# Patient Record
Sex: FEMALE | Race: ASIAN | NOT HISPANIC OR LATINO
[De-identification: names, ages, dates, MRNs, and addresses within clinical notes are randomized per-mention and may not be internally consistent; named-entity substitution may affect disease eponyms.]

---

## 2017-05-11 ENCOUNTER — TRANSCRIPTION ENCOUNTER (OUTPATIENT)
Age: 62
End: 2017-05-11

## 2017-06-27 ENCOUNTER — APPOINTMENT (OUTPATIENT)
Dept: ORTHOPEDIC SURGERY | Facility: CLINIC | Age: 62
End: 2017-06-27

## 2017-06-27 VITALS
BODY MASS INDEX: 24.19 KG/M2 | HEART RATE: 65 BPM | HEIGHT: 59 IN | DIASTOLIC BLOOD PRESSURE: 80 MMHG | WEIGHT: 120 LBS | SYSTOLIC BLOOD PRESSURE: 145 MMHG

## 2017-06-29 RX ORDER — LIDOCAINE HYDROCHLORIDE 10 MG/ML
1 INJECTION, SOLUTION INFILTRATION; PERINEURAL
Refills: 0 | Status: COMPLETED | OUTPATIENT
Start: 2017-06-29

## 2017-06-29 RX ORDER — METHYLPRED ACET/NACL,ISO-OS/PF 40 MG/ML
40 VIAL (ML) INJECTION
Qty: 1 | Refills: 0 | Status: COMPLETED | OUTPATIENT
Start: 2017-06-29

## 2017-06-29 RX ADMIN — METHYLPREDNISOLONE ACETATE MG/ML: 40 INJECTION, SUSPENSION INTRA-ARTICULAR; INTRALESIONAL; INTRAMUSCULAR; SOFT TISSUE at 00:00

## 2017-06-29 RX ADMIN — LIDOCAINE HYDROCHLORIDE %: 10 INJECTION, SOLUTION INFILTRATION; PERINEURAL at 00:00

## 2017-07-10 ENCOUNTER — TRANSCRIPTION ENCOUNTER (OUTPATIENT)
Age: 62
End: 2017-07-10

## 2017-07-11 ENCOUNTER — RESULT REVIEW (OUTPATIENT)
Age: 62
End: 2017-07-11

## 2017-07-26 ENCOUNTER — APPOINTMENT (OUTPATIENT)
Dept: SURGERY | Facility: CLINIC | Age: 62
End: 2017-07-26

## 2017-07-26 VITALS
HEART RATE: 72 BPM | BODY MASS INDEX: 23.16 KG/M2 | SYSTOLIC BLOOD PRESSURE: 123 MMHG | WEIGHT: 118 LBS | HEIGHT: 60 IN | OXYGEN SATURATION: 99 % | RESPIRATION RATE: 15 BRPM | DIASTOLIC BLOOD PRESSURE: 88 MMHG | TEMPERATURE: 98.1 F

## 2017-07-26 DIAGNOSIS — K62.9 DISEASE OF ANUS AND RECTUM, UNSPECIFIED: ICD-10-CM

## 2017-07-26 DIAGNOSIS — K64.5 PERIANAL VENOUS THROMBOSIS: ICD-10-CM

## 2017-07-26 RX ORDER — FLUTICASONE PROPIONATE 50 UG/1
50 SPRAY, METERED NASAL
Qty: 16 | Refills: 0 | Status: DISCONTINUED | COMMUNITY
Start: 2017-06-13

## 2017-07-26 RX ORDER — RAMIPRIL 10 MG
TABLET ORAL
Refills: 0 | Status: DISCONTINUED | COMMUNITY

## 2017-07-26 RX ORDER — RAMIPRIL 5 MG/1
5 CAPSULE ORAL
Refills: 0 | Status: ACTIVE | COMMUNITY

## 2017-07-27 ENCOUNTER — APPOINTMENT (OUTPATIENT)
Dept: ORTHOPEDIC SURGERY | Facility: CLINIC | Age: 62
End: 2017-07-27
Payer: COMMERCIAL

## 2017-07-27 VITALS
HEART RATE: 68 BPM | HEIGHT: 60 IN | SYSTOLIC BLOOD PRESSURE: 117 MMHG | DIASTOLIC BLOOD PRESSURE: 78 MMHG | WEIGHT: 118 LBS | BODY MASS INDEX: 23.16 KG/M2

## 2017-07-27 DIAGNOSIS — Z86.79 PERSONAL HISTORY OF OTHER DISEASES OF THE CIRCULATORY SYSTEM: ICD-10-CM

## 2017-07-27 DIAGNOSIS — Z87.39 PERSONAL HISTORY OF OTHER DISEASES OF THE MUSCULOSKELETAL SYSTEM AND CONNECTIVE TISSUE: ICD-10-CM

## 2017-07-27 PROCEDURE — 99214 OFFICE O/P EST MOD 30 MIN: CPT

## 2017-07-27 RX ORDER — HYDROCORTISONE ACETATE AND PRAMOXINE HYDROCHLORIDE 25; 10 MG/G; MG/G
2.5-1 CREAM TOPICAL
Qty: 30 | Refills: 1 | Status: ACTIVE | COMMUNITY
Start: 2017-07-27 | End: 1900-01-01

## 2017-07-27 RX ORDER — DICLOFENAC SODIUM 100 MG/1
100 TABLET, FILM COATED, EXTENDED RELEASE ORAL
Qty: 30 | Refills: 0 | Status: ACTIVE | COMMUNITY
Start: 2017-07-27 | End: 1900-01-01

## 2017-07-27 RX ORDER — HYDROCORTISONE ACETATE, PRAMOXINE HCL 2.5; 1 G/100G; G/100G
2.5-1 CREAM TOPICAL
Qty: 30 | Refills: 0 | Status: DISCONTINUED | COMMUNITY
Start: 2017-07-26

## 2017-07-27 RX ORDER — PRAMOXINE HYDROCHLORIDE AND HYDROCORTISONE ACETATE 10; 25 MG/G; MG/G
2.5-1 CREAM TOPICAL
Qty: 30 | Refills: 2 | Status: DISCONTINUED | COMMUNITY
Start: 2017-07-26 | End: 2017-07-27

## 2017-07-30 ENCOUNTER — FORM ENCOUNTER (OUTPATIENT)
Age: 62
End: 2017-07-30

## 2017-07-31 ENCOUNTER — OUTPATIENT (OUTPATIENT)
Dept: OUTPATIENT SERVICES | Facility: HOSPITAL | Age: 62
LOS: 1 days | End: 2017-07-31
Payer: COMMERCIAL

## 2017-07-31 ENCOUNTER — APPOINTMENT (OUTPATIENT)
Dept: MRI IMAGING | Facility: CLINIC | Age: 62
End: 2017-07-31
Payer: COMMERCIAL

## 2017-07-31 DIAGNOSIS — M17.0 BILATERAL PRIMARY OSTEOARTHRITIS OF KNEE: ICD-10-CM

## 2017-07-31 PROCEDURE — 73721 MRI JNT OF LWR EXTRE W/O DYE: CPT

## 2017-07-31 PROCEDURE — 73721 MRI JNT OF LWR EXTRE W/O DYE: CPT | Mod: 26,RT

## 2017-08-31 ENCOUNTER — APPOINTMENT (OUTPATIENT)
Dept: ORTHOPEDIC SURGERY | Facility: CLINIC | Age: 62
End: 2017-08-31
Payer: COMMERCIAL

## 2017-08-31 PROCEDURE — 99214 OFFICE O/P EST MOD 30 MIN: CPT

## 2017-08-31 RX ORDER — HYALURONATE SODIUM 20 MG/2 ML
20 SYRINGE (ML) INTRAARTICULAR
Qty: 2 | Refills: 0 | Status: ACTIVE | OUTPATIENT
Start: 2017-08-31

## 2017-09-01 ENCOUNTER — OTHER (OUTPATIENT)
Age: 62
End: 2017-09-01

## 2017-09-14 ENCOUNTER — OTHER (OUTPATIENT)
Age: 62
End: 2017-09-14

## 2017-09-28 ENCOUNTER — APPOINTMENT (OUTPATIENT)
Dept: ORTHOPEDIC SURGERY | Facility: CLINIC | Age: 62
End: 2017-09-28
Payer: COMMERCIAL

## 2017-09-28 VITALS
SYSTOLIC BLOOD PRESSURE: 133 MMHG | HEART RATE: 69 BPM | BODY MASS INDEX: 23.16 KG/M2 | WEIGHT: 118 LBS | HEIGHT: 60 IN | DIASTOLIC BLOOD PRESSURE: 86 MMHG

## 2017-09-28 PROCEDURE — 20610 DRAIN/INJ JOINT/BURSA W/O US: CPT | Mod: 50

## 2017-09-28 PROCEDURE — 99213 OFFICE O/P EST LOW 20 MIN: CPT | Mod: 25

## 2017-10-05 ENCOUNTER — APPOINTMENT (OUTPATIENT)
Dept: ORTHOPEDIC SURGERY | Facility: CLINIC | Age: 62
End: 2017-10-05
Payer: COMMERCIAL

## 2017-10-05 VITALS
WEIGHT: 120 LBS | BODY MASS INDEX: 23.56 KG/M2 | HEART RATE: 67 BPM | SYSTOLIC BLOOD PRESSURE: 145 MMHG | DIASTOLIC BLOOD PRESSURE: 90 MMHG | HEIGHT: 60 IN

## 2017-10-05 PROCEDURE — 20610 DRAIN/INJ JOINT/BURSA W/O US: CPT | Mod: 50

## 2017-10-12 ENCOUNTER — APPOINTMENT (OUTPATIENT)
Dept: ORTHOPEDIC SURGERY | Facility: CLINIC | Age: 62
End: 2017-10-12
Payer: COMMERCIAL

## 2017-10-12 VITALS — SYSTOLIC BLOOD PRESSURE: 130 MMHG | DIASTOLIC BLOOD PRESSURE: 82 MMHG | HEART RATE: 75 BPM

## 2017-10-12 PROCEDURE — 20610 DRAIN/INJ JOINT/BURSA W/O US: CPT | Mod: 50

## 2017-10-31 ENCOUNTER — APPOINTMENT (OUTPATIENT)
Dept: ORTHOPEDIC SURGERY | Facility: CLINIC | Age: 62
End: 2017-10-31

## 2017-11-09 ENCOUNTER — APPOINTMENT (OUTPATIENT)
Dept: ORTHOPEDIC SURGERY | Facility: CLINIC | Age: 62
End: 2017-11-09

## 2017-12-28 ENCOUNTER — APPOINTMENT (OUTPATIENT)
Dept: ORTHOPEDIC SURGERY | Facility: CLINIC | Age: 62
End: 2017-12-28

## 2018-07-13 ENCOUNTER — APPOINTMENT (OUTPATIENT)
Dept: OBGYN | Facility: CLINIC | Age: 63
End: 2018-07-13

## 2018-10-10 ENCOUNTER — OUTPATIENT (OUTPATIENT)
Dept: OUTPATIENT SERVICES | Facility: HOSPITAL | Age: 63
LOS: 1 days | End: 2018-10-10
Payer: COMMERCIAL

## 2018-10-10 ENCOUNTER — APPOINTMENT (OUTPATIENT)
Dept: MAMMOGRAPHY | Facility: IMAGING CENTER | Age: 63
End: 2018-10-10
Payer: COMMERCIAL

## 2018-10-10 DIAGNOSIS — Z00.8 ENCOUNTER FOR OTHER GENERAL EXAMINATION: ICD-10-CM

## 2018-10-10 PROCEDURE — 77067 SCR MAMMO BI INCL CAD: CPT

## 2018-10-10 PROCEDURE — 77063 BREAST TOMOSYNTHESIS BI: CPT

## 2018-10-10 PROCEDURE — 77067 SCR MAMMO BI INCL CAD: CPT | Mod: 26

## 2018-10-10 PROCEDURE — 77063 BREAST TOMOSYNTHESIS BI: CPT | Mod: 26

## 2018-11-09 ENCOUNTER — TRANSCRIPTION ENCOUNTER (OUTPATIENT)
Age: 63
End: 2018-11-09

## 2018-12-04 ENCOUNTER — RESULT REVIEW (OUTPATIENT)
Age: 63
End: 2018-12-04

## 2018-12-27 ENCOUNTER — OUTPATIENT (OUTPATIENT)
Dept: OUTPATIENT SERVICES | Facility: HOSPITAL | Age: 63
LOS: 1 days | End: 2018-12-27
Payer: COMMERCIAL

## 2018-12-27 ENCOUNTER — APPOINTMENT (OUTPATIENT)
Dept: CV DIAGNOSITCS | Facility: HOSPITAL | Age: 63
End: 2018-12-27

## 2018-12-27 DIAGNOSIS — I25.10 ATHEROSCLEROTIC HEART DISEASE OF NATIVE CORONARY ARTERY WITHOUT ANGINA PECTORIS: ICD-10-CM

## 2018-12-27 PROCEDURE — 93306 TTE W/DOPPLER COMPLETE: CPT | Mod: 26

## 2018-12-27 PROCEDURE — 93306 TTE W/DOPPLER COMPLETE: CPT

## 2019-01-17 ENCOUNTER — OUTPATIENT (OUTPATIENT)
Dept: OUTPATIENT SERVICES | Facility: HOSPITAL | Age: 64
LOS: 1 days | End: 2019-01-17
Payer: COMMERCIAL

## 2019-01-17 ENCOUNTER — APPOINTMENT (OUTPATIENT)
Dept: ULTRASOUND IMAGING | Facility: HOSPITAL | Age: 64
End: 2019-01-17
Payer: COMMERCIAL

## 2019-01-17 DIAGNOSIS — Z00.00 ENCOUNTER FOR GENERAL ADULT MEDICAL EXAMINATION WITHOUT ABNORMAL FINDINGS: ICD-10-CM

## 2019-01-17 DIAGNOSIS — Z00.01 ENCOUNTER FOR GENERAL ADULT MEDICAL EXAMINATION WITH ABNORMAL FINDINGS: ICD-10-CM

## 2019-01-17 PROCEDURE — 93880 EXTRACRANIAL BILAT STUDY: CPT | Mod: 26

## 2019-01-17 PROCEDURE — 93880 EXTRACRANIAL BILAT STUDY: CPT

## 2019-06-13 ENCOUNTER — APPOINTMENT (OUTPATIENT)
Dept: ORTHOPEDIC SURGERY | Facility: CLINIC | Age: 64
End: 2019-06-13
Payer: COMMERCIAL

## 2019-06-13 VITALS — SYSTOLIC BLOOD PRESSURE: 121 MMHG | DIASTOLIC BLOOD PRESSURE: 88 MMHG | HEART RATE: 74 BPM

## 2019-06-13 PROCEDURE — 99214 OFFICE O/P EST MOD 30 MIN: CPT

## 2019-06-13 PROCEDURE — 73562 X-RAY EXAM OF KNEE 3: CPT | Mod: 50

## 2019-06-13 RX ORDER — HYALURONATE SODIUM 20 MG/2 ML
20 SYRINGE (ML) INTRAARTICULAR
Qty: 2 | Refills: 0 | Status: ACTIVE | OUTPATIENT
Start: 2019-06-13

## 2019-06-16 NOTE — HISTORY OF PRESENT ILLNESS
[Pain] : pain [10] : currently ~his/her~ pain is 10 out of 10 [Walking] : walking [Standing] : standing [Intermit.] : ~He/She~ states the symptoms seem to be intermittent [Sitting] : sitting [Rest] : relieved by rest [All Hx] : past medical, family, and social [Knee Flexion] : worsened with knee flexion [Knee Extension] : worsened with knee extension [All] : medication and allergy [All Other ROS Normal] : All other review of systems are negative except as noted [Joint Pain] : joint pain [Muscle Aches] : muscle aches [Joint Stiffness] : joint stiffness [FreeTextEntry2] : Patient states that she has had pain in her knees for many years but for the past two months she has had sever pain in both knees right is much worse than the left. She was last seen last month and had an MRI of the right knee. She has been seeing some improvement since doing some pool exercises, but she feels that she is still limited. She is unable to climb stairs. \par She initially felt pain tow months ago, after dancing.  [FreeTextEntry1] : Bilateral knee pain [de-identified] : Ms. SINGH SYLVESTER is a 64 year old female  presenting with bilateral knee pain here for a follow up. She states her right knee is more painful  than the left. She localizes the pain to the medial anterior aspect of the bilateral knees. She admits to occasional swelling, clicking and grinding of the knee. She has occasional buckling as well. she describes the pain as sharp and intermittent, and states the pain is present when walking and climbing stairs. \par She was last seen in 2017, and received Euflexxa injections with relief for many months. She has had physical therapy as well in the past with minimal relief. She has been taking medications for pain as needed with no relief.\par She is here for reevaluation. She would like to consider injections again.  [Worsening] : worsening [5] : a current pain level of 5/10

## 2019-06-16 NOTE — DISCUSSION/SUMMARY
[de-identified] : Right knee medial meniscus tear with bilateral knee advanced degenerative joint disease \par The natural history and treatment of degenerative arthritis was discussed with the patient at length today. The spectrum of treatment including nonoperative modalities to surgical intervention was elucidated. Noninvasive and nonoperative treatment modalities include weight reduction, activity modification with low impact exercise,  as needed use of acetaminophen or anti-inflammatory medications if tolerated, glucosamine/chondroitin supplements, and physical therapy. Further treatments can include corticosteroid injection and the use of viscosupplementation with hyaluronic acid injections. Definitive surgical treatment can certainly include total joint arthroplasty also. The risks and benefits of each treatment options was discussed and all questions were answered.\par The patient was informed of the findings and recommended conservative management in the form of a home exercise program, activity modifications, stationary bicycling, swimming and weight loss program. A trial of Glucosamine and Chondroiten Sulphate was recommended.\par I have recommended Euflexxa injections to the bilateral knee and the patient agreed to proceed, and the risks, benefits and side effects were discussed.Follow-up appointment was recommended once the injection is received in the office to begin the series\par She will continue home exercises.

## 2019-06-16 NOTE — PHYSICAL EXAM
[Antalgic] : antalgic [LE] : Sensory: Intact in bilateral lower extremities [Knee] : patellar 2+ and symmetric bilaterally [Ankle] : ankle 2+ and symmetric bilaterally [DP] : dorsalis pedis 2+ and symmetric bilaterally [PT] : posterior tibial 2+ and symmetric bilaterally [Poor Appearance] : well-appearing [Acute Distress] : not in acute distress [Obese] : not obese [FreeTextEntry2] : On general examination the patient is adequately groomed and nourished. The vital parameters are as recorded. \par There is no lymphedema or diffuse swelling, no varicose veins, no skin warmth/erythema/scars/swelling, no ulcers and no palpable lymph nodes or masses in both lower extremities. Bilateral pedal pulses are well palpable.\par Upper Extremity:\par Both right and left upper extremities are unremarkable in terms of skin rash, lesions, pigmentation, redness, tenderness, swelling, joint instability, abnormal deformity or crepitus. The overall range of motion, sensation, motor tone and strength testing are normal.\par \par Bilateral Knee Exam: \par The gait is bilateral stiff knee antalgic.\par Knee alignment:         slightly varus\par Both knees demonstrate no scars and the skin has no warmth, erythema, swelling or tenderness. \par Both knees have a range of motion of\par Extension:                    Right 0 degrees     Left 0 degrees\par Flexion:                                   Right 110 degrees      Left 120 degrees\par There is bilateral knee medial joint line tenderness. There is bilateral knee mild effusion. \par Chen's test is positive. Cary test is positive.\par Lachman's test, Anterior/Posterior Drawer test and Pivot Shift Tests are negative. \par There is bilateral knee grade 1 MCL mediolateral laxity and no anteroposterior instability. \par Patella compression test is positive and patellofemoral tracking is normal with no lateral subluxation, apprehension or instability. \par Right knee quadriceps and hamstrings power is 4+.\par Left knee quadriceps and hamstrings power is 4+.\par \par Hip Exam:\par The gait and station is normal\par The patient has equal leg lengths and no pelvic tilt. Edison/Gabby test is 7 inches on the right and 7 inches on the left. Active SLR is 60 degrees on the right and 60 degrees on the left. Both hips demonstrate no scars and the skin has no signs of inflammation or tenderness. \par Both Hips have a normal range of motion of flexion to 100 degrees, abduction 40 degrees, adduction 20 degrees, external rotation 40 degrees, internal rotation 20 degrees with symmetrical motion in flexion and extension. There is no flexion contracture, deformity or instability. Labral impingement tests are negative.\par Both hips flexor, abductor and extensor power is normal.  [de-identified] : EXAM:    MRI  KNEE  WO  CON  RT\par \par \par PROCEDURE  DATE:    07/31/2017\par \par \par \par INTERPRETATION:\par MRI  OF  THE  RIGHT  KNEE\par \par CLINICAL  INDICATION:  Right  knee  pain.  History  of  osteoarthritis.\par TECHNIQUE:  Multiplanar,  Multisequence  MRI  was  obtained  of  the  right  knee.\par COMPARISON:  No  prior  studies  available  for  comparison.\par \par FINDINGS:\par \par CRUCIATE  AND  COLLATERAL  LIGAMENTS:  The  ACL  and  PCL  are  intact.  The  medial\par collateral  ligament  and  lateral  collateral  ligament  are  intact.\par MEDIAL  COMPARTMENT:  There  is  a  radial  tear  at  the  posterior  root  attachment\par of  the  medial  meniscus  with  partial  extrusion  of  the  body  of  the  meniscus\par beyond  the  medial  joint  line.  There  is  full-thickness  cartilage  loss  at  the\par central  weightbearing  medial  femoral  condyle  and  opposing  tibial  plateau\par with  mild  subchondral  cystic  changes  at  the  medial  femoral  condyle.  There\par are  areas  of  partial  thickness  cartilage  loss  at  the  posterior  medial\par femoral  condyle.  Marginal  osteophytosis  is  noted.\par LATERAL  COMPARTMENT:  The  lateral  meniscus  is  intact.  There  is  a  deep\par chondral  fissure  at  the  posterior  lateral  tibial  plateau.  There  is  cartilage\par thinning  at  the  posterior  lateral  femoral  condyle.  Marginal  osteophytosis  is\par noted.\par PATELLOFEMORAL  COMPARTMENT:  There  is  full-thickness  cartilage  loss  at  the\par lateral  patellar  facet  and  patellar  eminence  with  subchondral  cystic\par changes.  There  is  moderate  grade  chondral  wear  at  the  lateral  aspects  of  the\par lateral  trochlea.  Marginal  osteophytosis  is  noted.\par EXTENSOR  MECHANISM:  The  extensor  mechanism  is  intact.\par SYNOVIUM/  JOINT  FLUID:  There  is  a  small  knee  joint  fluid.  There  is  a  loose\par body  in  the  suprapatellar  recess  measuring  1.4  x  1.1  x  0.7  cm.\par BONE  MARROW:  No  acute  fracture  or  avascular  necrosis.  Subchondral  cystic\par changes  are  noted  at  the  lateral  patella,  lateral  trochlea  and  the  central\par weightbearing  medial  femoral  condyle.\par MUSCLES:  No  muscle  atrophy  or  edema.\par NEUROVASCULAR  STRUCTURES:  Varicose  veins  are  noted  at  the  posterior  knee.\par SUBCUTANEOUS  SOFT  TISSUES:  No  subcutaneous  soft  tissue  edema.\par \par IMPRESSION:\par \par Tricompartmental  degenerative  changes  most  pronounced  at  the  patellofemoral\par and  medial  compartments  as  described  above.  Associated  radial  tear  at  the\par posterior  horn  root  attachment  of  the  medial  meniscus  with  meniscal\par extrusion.\par \par Small  knee  joint  effusion  and  a  loose  body  in  the  suprapatellar  recess.\par \par \par \par The following radiographs were ordered and read by me during this patients visit. I reviewed each radiograph in detail with the patient and discussed the findings as highlighted below. \par AP, lateral and skyline views of the bilateral knees confirm advanced degenerative joint disease with medial joint space narrowing and osteophyte formation, right more than left.\par \par \par

## 2019-06-16 NOTE — CONSULT LETTER
[Dear  ___] : Dear  [unfilled], [Consult Letter:] : I had the pleasure of evaluating your patient, [unfilled]. [Please see my note below.] : Please see my note below. [Consult Closing:] : Thank you very much for allowing me to participate in the care of this patient.  If you have any questions, please do not hesitate to contact me. [Sincerely,] : Sincerely, [FreeTextEntry2] : PATRICK RINALDI\par  [FreeTextEntry3] : Tim Figueroa MD\par \par ______________________________________________\par [facility]: Hip/Knee Arthroplasty\par [address], [address], [place] [address]\par (t) [phone]\par (f) [phone]\par

## 2019-06-27 ENCOUNTER — APPOINTMENT (OUTPATIENT)
Dept: ORTHOPEDIC SURGERY | Facility: CLINIC | Age: 64
End: 2019-06-27
Payer: COMMERCIAL

## 2019-06-27 PROCEDURE — 20610 DRAIN/INJ JOINT/BURSA W/O US: CPT | Mod: 50

## 2019-06-27 NOTE — PROCEDURE
[Bilateral] : of bilateral [Injection] : Injection [Patient] : patient [Osteoarthritis] : Osteoarthritis [Knee Joint] : knee joint [Risk] : risk [Benefits] : benefits [Bleeding] : bleeding [Alternatives] : alternatives [Infection] : infection [Allergic Reaction] : allergic reaction [Verbal Consent Obtained] : verbal consent was obtained prior to the procedure [Betadine] : Betadine [Ethyl Chloride Spray] : ethyl chloride spray was used as a topical anesthetic [Lateral] : lateral [Anterior] : anterior [22] : a 22-gauge [2 mL Euflexxa___(lot #)] : 2mL Euflexxa ~Ulot# [unfilled] [de-identified] : 5/9/2020 Exp Lot [id number]\par The patient received the first set of injections to bilateral knees of Euflexxa and tolerated well. \par The patient has been instructed to ice and elevate to alleviate/reduce swelling. \par follow up appointment recommended next week for the second set of injections to bilateral knees. \par

## 2019-07-11 ENCOUNTER — APPOINTMENT (OUTPATIENT)
Dept: ORTHOPEDIC SURGERY | Facility: CLINIC | Age: 64
End: 2019-07-11
Payer: COMMERCIAL

## 2019-07-11 PROCEDURE — 20600 DRAIN/INJ JOINT/BURSA W/O US: CPT | Mod: 50

## 2019-07-12 NOTE — PROCEDURE
[Injection] : Injection [Bilateral] : of bilateral [Knee Joint] : knee joint [Osteoarthritis] : Osteoarthritis [Patient] : patient [Risk] : risk [Benefits] : benefits [Alternatives] : alternatives [Bleeding] : bleeding [Infection] : infection [Verbal Consent Obtained] : verbal consent was obtained prior to the procedure [Allergic Reaction] : allergic reaction [Betadine] : Betadine [Lateral] : lateral [Ethyl Chloride Spray] : ethyl chloride spray was used as a topical anesthetic [Anterior] : anterior [22] : a 22-gauge [2 mL Euflexxa___(lot #)] : 2mL Euflexxa ~Ulot# [unfilled] [de-identified] : 5/9/2020 Exp Lot [id number]\par The patient received the second set of injections to bilateral knees of Euflexxa and tolerated well. \par The patient has been instructed to ice and elevate to alleviate/reduce swelling. \par follow up appointment recommended next week for the third set of injections to bilateral knees. \par

## 2019-07-12 NOTE — END OF VISIT
[FreeTextEntry3] : I have read the above documentation and agree with the treatment and plan. \par Tim Figueroa MD\par \par ______________________________________________\par [facility]: Hip/Knee Arthroplasty\par [address], [address], [place] [address]\par (t) [phone]\par (f) [phone]\par

## 2019-07-17 ENCOUNTER — EMERGENCY (EMERGENCY)
Facility: HOSPITAL | Age: 64
LOS: 1 days | Discharge: HOME | End: 2019-07-17
Attending: EMERGENCY MEDICINE
Payer: COMMERCIAL

## 2019-07-17 VITALS
OXYGEN SATURATION: 98 % | HEART RATE: 99 BPM | TEMPERATURE: 98 F | WEIGHT: 119.93 LBS | RESPIRATION RATE: 16 BRPM | SYSTOLIC BLOOD PRESSURE: 157 MMHG | DIASTOLIC BLOOD PRESSURE: 98 MMHG | HEIGHT: 60 IN

## 2019-07-17 VITALS
TEMPERATURE: 98 F | HEART RATE: 71 BPM | SYSTOLIC BLOOD PRESSURE: 133 MMHG | DIASTOLIC BLOOD PRESSURE: 89 MMHG | OXYGEN SATURATION: 98 % | RESPIRATION RATE: 17 BRPM

## 2019-07-17 LAB
ALBUMIN SERPL ELPH-MCNC: 4.4 G/DL — SIGNIFICANT CHANGE UP (ref 3.3–5)
ALP SERPL-CCNC: 68 U/L — SIGNIFICANT CHANGE UP (ref 40–120)
ALT FLD-CCNC: 18 U/L — SIGNIFICANT CHANGE UP (ref 10–45)
ANION GAP SERPL CALC-SCNC: 11 MMOL/L — SIGNIFICANT CHANGE UP (ref 5–17)
APPEARANCE UR: CLEAR — SIGNIFICANT CHANGE UP
AST SERPL-CCNC: 19 U/L — SIGNIFICANT CHANGE UP (ref 10–40)
BACTERIA # UR AUTO: ABNORMAL
BILIRUB SERPL-MCNC: 0.4 MG/DL — SIGNIFICANT CHANGE UP (ref 0.2–1.2)
BILIRUB UR-MCNC: NEGATIVE — SIGNIFICANT CHANGE UP
BUN SERPL-MCNC: 18 MG/DL — SIGNIFICANT CHANGE UP (ref 7–23)
CALCIUM SERPL-MCNC: 8.8 MG/DL — SIGNIFICANT CHANGE UP (ref 8.4–10.5)
CHLORIDE SERPL-SCNC: 102 MMOL/L — SIGNIFICANT CHANGE UP (ref 96–108)
CO2 SERPL-SCNC: 26 MMOL/L — SIGNIFICANT CHANGE UP (ref 22–31)
COLOR SPEC: ABNORMAL
COMMENT - URINE: SIGNIFICANT CHANGE UP
CREAT SERPL-MCNC: 0.48 MG/DL — LOW (ref 0.5–1.3)
DIFF PNL FLD: NEGATIVE — SIGNIFICANT CHANGE UP
EPI CELLS # UR: 5 — SIGNIFICANT CHANGE UP
GLUCOSE SERPL-MCNC: 117 MG/DL — HIGH (ref 70–99)
GLUCOSE UR QL: NEGATIVE — SIGNIFICANT CHANGE UP
HCT VFR BLD CALC: 41.9 % — SIGNIFICANT CHANGE UP (ref 34.5–45)
HGB BLD-MCNC: 14.5 G/DL — SIGNIFICANT CHANGE UP (ref 11.5–15.5)
HYALINE CASTS # UR AUTO: 1 /LPF — SIGNIFICANT CHANGE UP (ref 0–7)
KETONES UR-MCNC: NEGATIVE — SIGNIFICANT CHANGE UP
LEUKOCYTE ESTERASE UR-ACNC: ABNORMAL
LIDOCAIN IGE QN: 17 U/L — SIGNIFICANT CHANGE UP (ref 7–60)
MCHC RBC-ENTMCNC: 29.9 PG — SIGNIFICANT CHANGE UP (ref 27–34)
MCHC RBC-ENTMCNC: 34.6 GM/DL — SIGNIFICANT CHANGE UP (ref 32–36)
MCV RBC AUTO: 86.5 FL — SIGNIFICANT CHANGE UP (ref 80–100)
NITRITE UR-MCNC: NEGATIVE — SIGNIFICANT CHANGE UP
PH UR: 6.5 — SIGNIFICANT CHANGE UP (ref 5–8)
PLATELET # BLD AUTO: 254 K/UL — SIGNIFICANT CHANGE UP (ref 150–400)
POTASSIUM SERPL-MCNC: 3.6 MMOL/L — SIGNIFICANT CHANGE UP (ref 3.5–5.3)
POTASSIUM SERPL-SCNC: 3.6 MMOL/L — SIGNIFICANT CHANGE UP (ref 3.5–5.3)
PROT SERPL-MCNC: 6.6 G/DL — SIGNIFICANT CHANGE UP (ref 6–8.3)
PROT UR-MCNC: ABNORMAL
RBC # BLD: 4.84 M/UL — SIGNIFICANT CHANGE UP (ref 3.8–5.2)
RBC # FLD: 12 % — SIGNIFICANT CHANGE UP (ref 10.3–14.5)
RBC CASTS # UR COMP ASSIST: 5 /HPF — HIGH (ref 0–4)
SODIUM SERPL-SCNC: 139 MMOL/L — SIGNIFICANT CHANGE UP (ref 135–145)
SP GR SPEC: 1.03 — HIGH (ref 1.01–1.02)
UROBILINOGEN FLD QL: NEGATIVE — SIGNIFICANT CHANGE UP
WBC # BLD: 9.8 K/UL — SIGNIFICANT CHANGE UP (ref 3.8–10.5)
WBC # FLD AUTO: 9.8 K/UL — SIGNIFICANT CHANGE UP (ref 3.8–10.5)
WBC UR QL: 6 /HPF — HIGH (ref 0–5)

## 2019-07-17 PROCEDURE — 99284 EMERGENCY DEPT VISIT MOD MDM: CPT | Mod: 25

## 2019-07-17 PROCEDURE — 81001 URINALYSIS AUTO W/SCOPE: CPT

## 2019-07-17 PROCEDURE — 83690 ASSAY OF LIPASE: CPT

## 2019-07-17 PROCEDURE — 74177 CT ABD & PELVIS W/CONTRAST: CPT

## 2019-07-17 PROCEDURE — 85027 COMPLETE CBC AUTOMATED: CPT

## 2019-07-17 PROCEDURE — 96374 THER/PROPH/DIAG INJ IV PUSH: CPT

## 2019-07-17 PROCEDURE — 74177 CT ABD & PELVIS W/CONTRAST: CPT | Mod: 26

## 2019-07-17 PROCEDURE — 80053 COMPREHEN METABOLIC PANEL: CPT

## 2019-07-17 PROCEDURE — 99284 EMERGENCY DEPT VISIT MOD MDM: CPT

## 2019-07-17 RX ORDER — SODIUM CHLORIDE 9 MG/ML
1000 INJECTION INTRAMUSCULAR; INTRAVENOUS; SUBCUTANEOUS ONCE
Refills: 0 | Status: DISCONTINUED | OUTPATIENT
Start: 2019-07-17 | End: 2019-07-17

## 2019-07-17 RX ORDER — SODIUM CHLORIDE 9 MG/ML
1000 INJECTION INTRAMUSCULAR; INTRAVENOUS; SUBCUTANEOUS ONCE
Refills: 0 | Status: COMPLETED | OUTPATIENT
Start: 2019-07-17 | End: 2019-07-17

## 2019-07-17 RX ORDER — FAMOTIDINE 10 MG/ML
20 INJECTION INTRAVENOUS ONCE
Refills: 0 | Status: COMPLETED | OUTPATIENT
Start: 2019-07-17 | End: 2019-07-17

## 2019-07-17 RX ADMIN — Medication 30 MILLILITER(S): at 06:28

## 2019-07-17 RX ADMIN — FAMOTIDINE 20 MILLIGRAM(S): 10 INJECTION INTRAVENOUS at 06:28

## 2019-07-17 RX ADMIN — SODIUM CHLORIDE 1000 MILLILITER(S): 9 INJECTION INTRAMUSCULAR; INTRAVENOUS; SUBCUTANEOUS at 05:46

## 2019-07-17 NOTE — ED ADULT NURSE REASSESSMENT NOTE - NS ED NURSE REASSESS COMMENT FT1
received report from JAREN Qiu. patient is AAOx3. lung sounds CTA bilaterally. cap refill <3sec. patient denies pain at this time. VSS. drinking IV contrast. to go to CT at 0730. patient aware of plan of care. will continue to monitor.

## 2019-07-17 NOTE — ED ADULT NURSE REASSESSMENT NOTE - NS ED NURSE REASSESS COMMENT FT1
0900- patient states she is feeling better. denies pain at this time. tolerated PO. VSS. waiting for d.c

## 2019-07-17 NOTE — ED PROVIDER NOTE - ATTENDING CONTRIBUTION TO CARE
****ATTENDING**** 65yo f presents with abdominal pain and diaphoresis. States she had chicken and beet salad and later developed abdominal pain crampy diffuse, w nausea and vomiting (x4) and diarrhea (x4-5). Pt states she has had symptoms similar prior over the year. no fever or chills.   No recent travel, f/c. pt states she feels slightly better that the intermittent pain is not happening every 10 mins, more like every 20 mins.   on exam, Patient is awake,alert,oriented x 3. Patient is well appearing and in no acute distress. Patient's chest is clear to ausculation, +s1s2. Abdomen is soft nd/+ epgiastric ruq/luq tender +BS. Extremity with no swelling or calf tenderness.   check labs, ct to eval for colitis.

## 2019-07-17 NOTE — ED ADULT NURSE NOTE - OBJECTIVE STATEMENT
65 y/o female with PMH HTN presenting to ED for intermittent cramping epigastric abdominal pain, n/v x 1 day. As per pt: "Today I felt really bloated and gassy. I had 2 chicken pieces and beet salad and a little while after I felt this vomiting and multiple episodes of diarrhea. I was sweating a lot. I've had a few episodes of this over the past 3 months but never this bad. " Upon exam pt A&Ox3 gross neuro intact, lungs cta bilaterally, no difficulty speaking in complete sentences, s1s2 heart sounds heard, pulses x 4, bender x4, abdomen soft tender to palpation in all 4 quadrants, +bowel sounds in all 4 quadrants, nondistended, skin intact, urine noted to be light red- pt states "it's from the beets". Pt denies chest pain, sob, ha,  , f/c, urinary symptoms, hematuria

## 2019-07-17 NOTE — ED PROVIDER NOTE - NSFOLLOWUPCLINICS_GEN_ALL_ED_FT
[facility]  Gastroenterology  [address], [address]  [place] [address]  Phone: [phone]  Fax:   Follow Up Time: Routine    [facility] Primary Care  Primary Care  [address] [place] [place] [address]  Phone: [phone]  Fax:   Follow Up Time: 4-6 Days

## 2019-07-17 NOTE — ED PROVIDER NOTE - NSFOLLOWUPINSTRUCTIONS_ED_ALL_ED_FT
Please follow up with your primary care physician over the next 3-5 days.   Please return to the ED if you develop severe abdominal pain, vomiting, fever greater than 100.4, diarrhea (more than 10 times per day), lightheadedness, weakness or any other concerning symptoms.     What is viral gastroenteritis?  Viral gastroenteritis is an infection that can cause diarrhea and vomiting. It happens when a person's stomach and intestines get infected with a virus (figure 1). Both adults and children can get viral gastroenteritis.    People can get the infection if they:    ?Touch an infected person or a surface with the virus on it, and then don't wash their hands    ?Eat foods or drink liquids with the virus in them. If people with the virus don't wash their hands, they can spread it to food or liquids they touch.    What are the symptoms of viral gastroenteritis?  The infection causes diarrhea and vomiting. People can have either diarrhea or vomiting, or both. These symptoms usually start suddenly, and can be severe.    Viral gastroenteritis can also cause:    ?A fever    ?A headache or muscle aches    ?Belly pain or cramping    ?A loss of appetite    If you have diarrhea and vomiting, your body can lose too much water. Doctors call this "dehydration." Dehydration can make you have dark yellow urine and feel thirsty, tired, dizzy, or confused.    Severe dehydration can be life-threatening. Babies, young children, and elderly people are more likely to get severe dehydration.    Do people with viral gastroenteritis need tests?  Not usually. Their doctor or nurse should be able to tell if they have it by learning about their symptoms and doing an exam. But the doctor or nurse might do tests to check for dehydration or to see which virus is causing the infection. These tests can include:    ?Blood tests    ?Urine tests    ?Tests on a sample of bowel movement    Is there anything I can do on my own to feel better or help my child?  Yes. People with viral gastroenteritis need to drink enough fluids so they don't get dehydrated.    Some fluids help prevent dehydration better than others:    ?Older children and adults can drink sports drinks.    ?You can give babies and young children an "oral rehydration solution," such as Pedialyte. You can buy this in a store or pharmacy. If your child is vomiting, you can try to give your child a few teaspoons of fluid every few minutes.    ?Babies who breastfeed can continue to breastfeed.    People with viral gastroenteritis should avoid drinking juice or soda. These can make diarrhea worse.    If you can keep food down, it's best to eat lean meats, fruits, vegetables, and whole-grain breads and cereals. Avoid eating foods with a lot of fat or sugar, which can make symptoms worse.    If you are an adult younger than 65 and you have a new bout of diarrhea but no fever or blood in your bowel movements, you can take medicine to stop diarrhea such as loperamide (brand name: Imodium) for 1 to 2 days. If you are older than 65, have a fever, or have blood in your bowel movements, do not take these medicines without checking with your doctor.    Do not give medicines to stop diarrhea to children.    Should I call the doctor or nurse?  Call the doctor or nurse if you or your child:    ?Has any symptoms of dehydration    ?Has diarrhea or vomiting that lasts longer than a few days    ?Vomits up blood, has bloody diarrhea, or has severe belly pain    ?Hasn't had anything to drink in a few hours (for children), or in many hours (for adults)    ?Hasn't needed to urinate in the past 6 to 8 hours (during the day), or if your baby or young child hasn't had a wet diaper for 4 to 6 hours    How is viral gastroenteritis treated?  Most people do not need any treatment, because their symptoms will get better on their own. But people with severe dehydration might need treatment in the hospital for their dehydration. This involves getting fluids through an "IV" (a thin tube that goes into the vein).    Doctors do not treat viral gastroenteritis with antibiotics. That's because antibiotics treat infections that are caused by bacteria – not viruses.    Can viral gastroenteritis be prevented?  Sometimes. To lower the chance of getting or spreading the infection, you can:    ?Wash your hands with soap and water after you use the bathroom or change your child's diaper, and before you eat.    ?Avoid changing your child's diaper near where you prepare food.    ?Make sure your baby gets the rotavirus vaccine. Vaccines can prevent certain serious or deadly infections. Rotavirus is a virus that commonly causes viral gastroenteritis in children.

## 2019-07-17 NOTE — ED PROVIDER NOTE - PROGRESS NOTE DETAILS
pt ambulatory to the bathroom in nad, she states she feels better, she is awaiting CT scan pt counselled on CT results, +enteritis, no abdominal infection, stable for outpt follow up. Pt continues to be well appearing in the ED,

## 2019-07-17 NOTE — ED PROVIDER NOTE - SHIFT CHANGE DETAILS
pt pending ct scan has upper abd pain, nausea and vomiting, suspect gastroenteritis, pending CT scan, dispo per scan

## 2019-07-25 ENCOUNTER — APPOINTMENT (OUTPATIENT)
Dept: ORTHOPEDIC SURGERY | Facility: CLINIC | Age: 64
End: 2019-07-25
Payer: COMMERCIAL

## 2019-07-25 PROCEDURE — 20610 DRAIN/INJ JOINT/BURSA W/O US: CPT | Mod: 50

## 2019-07-25 NOTE — PROCEDURE
[Injection] : Injection [Bilateral] : of bilateral [Knee Joint] : knee joint [Osteoarthritis] : Osteoarthritis [Patient] : patient [Risk] : risk [Benefits] : benefits [Alternatives] : alternatives [Bleeding] : bleeding [Infection] : infection [Allergic Reaction] : allergic reaction [Verbal Consent Obtained] : verbal consent was obtained prior to the procedure [Betadine] : Betadine [Ethyl Chloride Spray] : ethyl chloride spray was used as a topical anesthetic [Lateral] : lateral [Anterior] : anterior [22] : a 22-gauge [2 mL Euflexxa___(lot #)] : 2mL Euflexxa ~Ulot# [unfilled] [de-identified] : 5/9/2020 Exp Lot [id number]\par The patient received the third set of injections to bilateral knees of Euflexxa and tolerated well. \par The patient has been instructed to ice and elevate to alleviate/reduce swelling. \par follow up appointment recommended in four to six months. \par

## 2019-12-30 ENCOUNTER — TRANSCRIPTION ENCOUNTER (OUTPATIENT)
Age: 64
End: 2019-12-30

## 2020-01-04 ENCOUNTER — TRANSCRIPTION ENCOUNTER (OUTPATIENT)
Age: 65
End: 2020-01-04

## 2020-04-25 ENCOUNTER — MESSAGE (OUTPATIENT)
Age: 65
End: 2020-04-25

## 2020-05-02 LAB
SARS-COV-2 IGG SERPL IA-ACNC: <0.1 INDEX
SARS-COV-2 IGG SERPL QL IA: NEGATIVE

## 2020-05-07 ENCOUNTER — APPOINTMENT (OUTPATIENT)
Dept: DISASTER EMERGENCY | Facility: HOSPITAL | Age: 65
End: 2020-05-07

## 2020-05-11 ENCOUNTER — APPOINTMENT (OUTPATIENT)
Dept: ORTHOPEDIC SURGERY | Facility: CLINIC | Age: 65
End: 2020-05-11
Payer: COMMERCIAL

## 2020-05-11 VITALS — TEMPERATURE: 97.7 F

## 2020-05-11 DIAGNOSIS — Z00.00 ENCOUNTER FOR GENERAL ADULT MEDICAL EXAMINATION W/OUT ABNORMAL FINDINGS: ICD-10-CM

## 2020-05-11 PROCEDURE — 72100 X-RAY EXAM L-S SPINE 2/3 VWS: CPT

## 2020-05-11 PROCEDURE — 99214 OFFICE O/P EST MOD 30 MIN: CPT

## 2020-05-11 RX ORDER — IBUPROFEN 200 MG/1
200 TABLET ORAL
Qty: 90 | Refills: 0 | Status: ACTIVE | COMMUNITY
Start: 2020-05-11 | End: 1900-01-01

## 2020-05-11 RX ORDER — METHOCARBAMOL 500 MG/1
500 TABLET, FILM COATED ORAL 3 TIMES DAILY
Qty: 90 | Refills: 3 | Status: ACTIVE | COMMUNITY
Start: 2020-05-11 | End: 1900-01-01

## 2020-05-11 NOTE — PHYSICAL EXAM
[de-identified] : 5 out of 5 motor strength, sensation is intact and symmetrical full range of motion flexion extension and rotation, no palpatory tenderness full range of motion of hips knees shoulders and elbows (all four extremities), no atrophy, negative straight leg raise, no pathological reflexes, no swelling, normal ambulation, no apparent distress skin is intact, no palpable lymph nodes, no upper or lower extremity instability, alert and oriented x3 and normal mood. Normal finger-to nose test. Pain over right SI joint. \par \par AP/lat lumbar-adequate lordosis, mild degenerative changes-reviewed with the patient.

## 2020-05-11 NOTE — DISCUSSION/SUMMARY
[de-identified] : right sacroiliitis\par discussed all options\par Methocarbamol helped as did NSAIDs.\par discussed injection right SI joint\par All questions were answered, all alternatives discussed and the patient is in complete agreement with that plan. Follow-up appointment as instructed. Any issues and the patient will call or come in sooner.

## 2020-05-11 NOTE — HISTORY OF PRESENT ILLNESS
[de-identified] : Ms. SINGH SYLVESTER is a 65 year female who presents to office complaining of right sided lower back pain.\par she centers the pain over her right sacroiliac joint. \par This pain began about 3 weeks ago with insidious onset.\par Pain is described as a dull/achy sensation that is constant.\par Pain is worsened with prolonged sitting/standing/walking and improved with rest and OTC NSAIDs slightly.\par She denies radiating pain or distal neuropathy. She also denies bowel/bladder dysfunction or saddle anesthesia.\par No fever chills sweats nausea vomiting no bowel or bladder dysfunction, no recent weight loss or gain no night pain. This history is in addition to the intake form that I personally reviewed. \par

## 2020-06-15 ENCOUNTER — APPOINTMENT (OUTPATIENT)
Dept: ORTHOPEDIC SURGERY | Facility: CLINIC | Age: 65
End: 2020-06-15
Payer: COMMERCIAL

## 2020-06-15 VITALS — TEMPERATURE: 97.3 F

## 2020-06-15 DIAGNOSIS — M60.9 MYOSITIS, UNSPECIFIED: ICD-10-CM

## 2020-06-15 DIAGNOSIS — M54.5 LOW BACK PAIN: ICD-10-CM

## 2020-06-15 DIAGNOSIS — M79.10 MYALGIA, UNSPECIFIED SITE: ICD-10-CM

## 2020-06-15 PROCEDURE — 99213 OFFICE O/P EST LOW 20 MIN: CPT | Mod: 25

## 2020-06-15 PROCEDURE — 20552 NJX 1/MLT TRIGGER POINT 1/2: CPT

## 2020-06-25 ENCOUNTER — APPOINTMENT (OUTPATIENT)
Dept: ORTHOPEDIC SURGERY | Facility: CLINIC | Age: 65
End: 2020-06-25

## 2020-08-06 ENCOUNTER — APPOINTMENT (OUTPATIENT)
Dept: ORTHOPEDIC SURGERY | Facility: CLINIC | Age: 65
End: 2020-08-06
Payer: COMMERCIAL

## 2020-08-06 VITALS — TEMPERATURE: 98.2 F

## 2020-08-06 VITALS
WEIGHT: 120 LBS | BODY MASS INDEX: 23.56 KG/M2 | SYSTOLIC BLOOD PRESSURE: 125 MMHG | HEART RATE: 80 BPM | DIASTOLIC BLOOD PRESSURE: 81 MMHG | HEIGHT: 60 IN

## 2020-08-06 PROCEDURE — 73562 X-RAY EXAM OF KNEE 3: CPT | Mod: 50

## 2020-08-06 PROCEDURE — 99214 OFFICE O/P EST MOD 30 MIN: CPT

## 2020-08-06 NOTE — CONSULT LETTER
[Dear  ___] : Dear  [unfilled], [Please see my note below.] : Please see my note below. [Consult Letter:] : I had the pleasure of evaluating your patient, [unfilled]. [Sincerely,] : Sincerely, [FreeTextEntry2] : PATRICK RINALDI\par  [Consult Closing:] : Thank you very much for allowing me to participate in the care of this patient.  If you have any questions, please do not hesitate to contact me. [FreeTextEntry3] : Tim Figueroa MD\par \par ______________________________________________\par [facility]: Hip/Knee Arthroplasty\par [address], [address], [place] [address]\par (t) [phone]\par (f) [phone]\par

## 2020-08-06 NOTE — HISTORY OF PRESENT ILLNESS
[Worsening] : worsening [de-identified] : Ms. SINGH SYLVESTER is a 64 year old female  presenting with bilateral knee pain here for a follow up. She states her right knee is more painful  than the left. She localizes the pain to the medial anterior aspect of the bilateral knees. She admits to occasional swelling, clicking and grinding of the knee. She has occasional buckling as well. she describes the pain as sharp and intermittent, and states the pain is present when walking and climbing stairs. \par She was last seen in 2019, and received her second round of Euflexxa injections with relief for many months. She has had physical therapy as well in the past with minimal relief. She has been taking medications for pain as needed with no relief.\par She is here for reevaluation as her symptoms have worsened, and have been interfering with ADLS and quality of life.  [8] : a current pain level of 8/10 [Constant] : ~He/She~ states the symptoms seem to be constant [Walking] : worsened by walking [Rest] : relieved by rest

## 2020-08-06 NOTE — DISCUSSION/SUMMARY
[de-identified] : Bilateral knee advanced degenerative joint disease with right knee more painful than left. \par \par The natural history and treatment of degenerative arthritis was discussed with the patient at length today. The spectrum of treatment including nonoperative modalities to surgical intervention was elucidated. Noninvasive and nonoperative treatment modalities include weight reduction, activity modification with low impact exercise,  as needed use of acetaminophen or anti-inflammatory medications if tolerated, glucosamine/chondroitin supplements, and physical therapy. Further treatments can include corticosteroid injection and the use of viscosupplementation with hyaluronic acid injections. Definitive surgical treatment can certainly include total joint arthroplasty also. The risks and benefits of each treatment options was discussed and all questions were answered.\par In view of lack of adequate pain relief with conservative (non-surgical) management protocol including physical therapy, home exercises, weight loss, activity modification, NSAIDS; the patient is recommended to consider a right Total Knee Replacement. \par \par The risks, benefits, alternatives, implications, complications including but not limited to pain, stiffness, bleeding, limp, wound breakdown, infection, bone fracture, nerve and vascular compromise, implant wear, fixation options depending on bone quality, instability, and durability issues and rehabilitation were discussed and relevant questions were addressed. The possibility of recurrent pain, no improvement in pain and actual worsening of the pain were also mentioned in conversation with the patient. Medical complications related to the patient's general medical health including deep vein thrombosis, pulmonary embolus, heart attack, stroke, death and other complications from anesthesia were discussed as well.  Anticoagulation prophylaxis medication options to address risks of deep vein thrombosis and pulmonary embolism were discussed and weighed against the risks of bleeding and wound healing complications. The patient elected Ecotrin/Xarelto prophylaxis with mechanical modalities.\par \par  I have reviewed the plan of care as well as a model of a total knee replacement implant equivalent to the one that will be used for their total knee replacement.  The patient agrees with the plan of care, as well as the use of implants for their total knee replacement.  The patient wishes to proceed and will undergo preoperative medical evaluation and clearance, attend the preoperative educational class and will schedule surgery appropriately.\par \par Patient has been indicated for a HERBERTH Robotic Assisted total knee replacement. A prescription for a CT scan with HERBERTH Protocol was provided for the right knee.\par \par The patient was advised that a COVID PCR test would be required within 72 hours prior to surgery.

## 2020-08-06 NOTE — REASON FOR VISIT
[Follow-Up Visit] : a follow-up visit for [FreeTextEntry2] : bilateral knee DJD [Knee Pain] : knee pain

## 2020-08-06 NOTE — PHYSICAL EXAM
[de-identified] : On general examination the patient is adequately groomed and nourished. The vital parameters are as recorded. \par There is no lymphedema or diffuse swelling, no varicose veins, no skin warmth/erythema/scars/swelling, no ulcers and no palpable lymph nodes or masses in both lower extremities. Bilateral pedal pulses are well palpable.\par Upper Extremity:\par Both right and left upper extremities are unremarkable in terms of skin rash, lesions, pigmentation, redness, tenderness, swelling, joint instability, abnormal deformity or crepitus. The overall range of motion, sensation, motor tone and strength testing are normal.\par \par Bilateral Knee Exam: \par The gait is bilateral stiff knee antalgic.\par Knee alignment:            Right 10 degrees varus with no flexion deformity. \par Left 10 degrees varus with no flexion deformity.\par Both knees demonstrate no scars and the skin has no warmth, erythema, swelling or tenderness. \par Both knees have a range of motion of\par Extension:                    Right 0 degrees     Left 0 degrees\par Flexion:                                   Right 110 degrees      Left 100 degrees\par There is bilateral knee medial joint line tenderness. There is bilateral knee mild effusion. \par Chen's test is positive. Cary test is positive.\par Lachman's test, Anterior/Posterior Drawer test and Pivot Shift Tests are negative. \par There is bilateral knee grade 1 MCL mediolateral laxity and no anteroposterior instability. \par Patella compression test is positive and patellofemoral tracking is normal with no lateral subluxation, apprehension or instability. \par Right knee quadriceps and hamstrings power is 4+.\par Left knee quadriceps and hamstrings power is 4+.\par \par Hip Exam:\par The gait and station is normal\par The patient has equal leg lengths and no pelvic tilt. Edison/Gabby test is 7 inches on the right and 7 inches on the left. Active SLR is 60 degrees on the right and 60 degrees on the left. Both hips demonstrate no scars and the skin has no signs of inflammation or tenderness. \par Both Hips have a normal range of motion of flexion to 100 degrees, abduction 40 degrees, adduction 20 degrees, external rotation 40 degrees, internal rotation 20 degrees with symmetrical motion in flexion and extension. There is no flexion contracture, deformity or instability. Labral impingement tests are negative.\par Both hips flexor, abductor and extensor power is normal.\par  [de-identified] : EXAM:    MRI  KNEE  WO  CON  RT\par \par \par PROCEDURE  DATE:    07/31/2017\par \par \par \par INTERPRETATION:\par MRI  OF  THE  RIGHT  KNEE\par \par CLINICAL  INDICATION:  Right  knee  pain.  History  of  osteoarthritis.\par TECHNIQUE:  Multiplanar,  Multisequence  MRI  was  obtained  of  the  right  knee.\par COMPARISON:  No  prior  studies  available  for  comparison.\par \par FINDINGS:\par \par CRUCIATE  AND  COLLATERAL  LIGAMENTS:  The  ACL  and  PCL  are  intact.  The  medial\par collateral  ligament  and  lateral  collateral  ligament  are  intact.\par MEDIAL  COMPARTMENT:  There  is  a  radial  tear  at  the  posterior  root  attachment\par of  the  medial  meniscus  with  partial  extrusion  of  the  body  of  the  meniscus\par beyond  the  medial  joint  line.  There  is  full-thickness  cartilage  loss  at  the\par central  weightbearing  medial  femoral  condyle  and  opposing  tibial  plateau\par with  mild  subchondral  cystic  changes  at  the  medial  femoral  condyle.  There\par are  areas  of  partial  thickness  cartilage  loss  at  the  posterior  medial\par femoral  condyle.  Marginal  osteophytosis  is  noted.\par LATERAL  COMPARTMENT:  The  lateral  meniscus  is  intact.  There  is  a  deep\par chondral  fissure  at  the  posterior  lateral  tibial  plateau.  There  is  cartilage\par thinning  at  the  posterior  lateral  femoral  condyle.  Marginal  osteophytosis  is\par noted.\par PATELLOFEMORAL  COMPARTMENT:  There  is  full-thickness  cartilage  loss  at  the\par lateral  patellar  facet  and  patellar  eminence  with  subchondral  cystic\par changes.  There  is  moderate  grade  chondral  wear  at  the  lateral  aspects  of  the\par lateral  trochlea.  Marginal  osteophytosis  is  noted.\par EXTENSOR  MECHANISM:  The  extensor  mechanism  is  intact.\par SYNOVIUM/  JOINT  FLUID:  There  is  a  small  knee  joint  fluid.  There  is  a  loose\par body  in  the  suprapatellar  recess  measuring  1.4  x  1.1  x  0.7  cm.\par BONE  MARROW:  No  acute  fracture  or  avascular  necrosis.  Subchondral  cystic\par changes  are  noted  at  the  lateral  patella,  lateral  trochlea  and  the  central\par weightbearing  medial  femoral  condyle.\par MUSCLES:  No  muscle  atrophy  or  edema.\par NEUROVASCULAR  STRUCTURES:  Varicose  veins  are  noted  at  the  posterior  knee.\par SUBCUTANEOUS  SOFT  TISSUES:  No  subcutaneous  soft  tissue  edema.\par \par IMPRESSION:\par \par Tricompartmental  degenerative  changes  most  pronounced  at  the  patellofemoral\par and  medial  compartments  as  described  above.  Associated  radial  tear  at  the\par posterior  horn  root  attachment  of  the  medial  meniscus  with  meniscal\par extrusion.\par \par Small  knee  joint  effusion  and  a  loose  body  in  the  suprapatellar  recess.\par \par The following radiographs were ordered and read by me during this patients visit. I reviewed each radiograph in detail with the patient and discussed the findings as highlighted below. \par AP, lateral and skyline views of the bilateral knees confirm advanced degenerative joint disease with medial joint space narrowing and osteophyte formation, right more than left. \par AP view of the pelvis taken previously are within normal limits\par

## 2020-11-03 ENCOUNTER — TRANSCRIPTION ENCOUNTER (OUTPATIENT)
Age: 65
End: 2020-11-03

## 2020-11-23 ENCOUNTER — APPOINTMENT (OUTPATIENT)
Dept: ORTHOPEDIC SURGERY | Facility: CLINIC | Age: 65
End: 2020-11-23
Payer: COMMERCIAL

## 2020-11-23 VITALS — TEMPERATURE: 97.2 F

## 2020-11-23 VITALS — BODY MASS INDEX: 25.89 KG/M2 | HEIGHT: 57 IN | WEIGHT: 120 LBS

## 2020-11-23 PROCEDURE — 99214 OFFICE O/P EST MOD 30 MIN: CPT

## 2020-11-23 RX ORDER — HYALURONATE SODIUM, STABILIZED 88 MG/4 ML
88 SYRINGE (ML) INTRAARTICULAR
Qty: 1 | Refills: 0 | Status: ACTIVE | COMMUNITY
Start: 2020-11-23 | End: 1900-01-01

## 2020-12-03 ENCOUNTER — APPOINTMENT (OUTPATIENT)
Dept: ORTHOPEDIC SURGERY | Facility: CLINIC | Age: 65
End: 2020-12-03
Payer: COMMERCIAL

## 2020-12-03 PROCEDURE — 99072 ADDL SUPL MATRL&STAF TM PHE: CPT

## 2020-12-03 PROCEDURE — 20610 DRAIN/INJ JOINT/BURSA W/O US: CPT | Mod: 50

## 2020-12-08 ENCOUNTER — NON-APPOINTMENT (OUTPATIENT)
Age: 65
End: 2020-12-08

## 2020-12-18 ENCOUNTER — APPOINTMENT (OUTPATIENT)
Dept: ORTHOPEDIC SURGERY | Facility: CLINIC | Age: 65
End: 2020-12-18
Payer: COMMERCIAL

## 2020-12-18 VITALS — TEMPERATURE: 97.3 F

## 2020-12-18 PROCEDURE — 99214 OFFICE O/P EST MOD 30 MIN: CPT

## 2020-12-18 PROCEDURE — 99072 ADDL SUPL MATRL&STAF TM PHE: CPT

## 2021-02-23 ENCOUNTER — APPOINTMENT (OUTPATIENT)
Dept: ORTHOPEDIC SURGERY | Facility: CLINIC | Age: 66
End: 2021-02-23
Payer: COMMERCIAL

## 2021-02-23 PROCEDURE — 20610 DRAIN/INJ JOINT/BURSA W/O US: CPT | Mod: 50

## 2021-02-23 PROCEDURE — 99214 OFFICE O/P EST MOD 30 MIN: CPT | Mod: 25

## 2021-02-23 PROCEDURE — 73564 X-RAY EXAM KNEE 4 OR MORE: CPT | Mod: 50

## 2021-02-23 PROCEDURE — 99072 ADDL SUPL MATRL&STAF TM PHE: CPT

## 2021-02-23 NOTE — DISCUSSION/SUMMARY
[de-identified] : This patient has bilateral knee osteoarthritis.  The patient is not an appropriate candidate for surgical intervention at this time. An extensive discussion was conducted on the natural history of the disease and the variety of surgical and non-surgical options available to the patient including, but not limited to non-steroidal anti-inflammatory medications, steroid injections, physical therapy, maintenance of ideal body weight, and reduction of activity.  Today we performed bilateral knee intra-articular cortisone injections.\par The patient will schedule an appointment as needed.\par \par Informed consent for bilateral knee injections was obtained. All questions were answered. A time out was performed. The bilateral knees were prepped and draped in sterile fashion. Using sterile technique, the bilateral knees were each injected with 1cc of Kenalog, 4cc of 1% lidocaine, 4cc of 0.25% marcaine using a 21-gauge needle. A sterile dressing was applied. Post injection instructions were reviewed. The patient tolerated the procedure well.\par

## 2021-02-23 NOTE — HISTORY OF PRESENT ILLNESS
[de-identified] : This is very nice 65-year-old female experiencing bilateral knee pain, which is severe in intensity. The pain substantially limits activities of daily living. Walking tolerance is reduced. Medication and activity modification have been minimally effective for a period lasting greater than three months in duration. Assistive devices and external support were not deemed by the patient to be helpful in improving their function.  She has tried intra-articular cortisone injections and hyaluronic acid injections in the past which helped for short period of time.  She takes Advil or diclofenac at different times which do help for short period time.  She already has tried a course of physical therapy. Pain and restriction of function are intolerable at this time. The patient denies any radiation of the pain to the feet and it is not associated with numbness, tingling, or weakness.

## 2021-02-23 NOTE — PHYSICAL EXAM
[de-identified] : Patient is well nourished, well-developed, in no acute distress, with appropriate mood and affect. The patient is oriented to time, place, and person. Respirations are even and unlabored. Gait evaluation does reveal a limp. There is no inguinal adenopathy. Bilateral limbs are well-perfused, without skin lesions, shows a grossly normal motor and sensory examination. The right knee motion is significantly reduced and does cause significant pain. The right knee moves from 0 to 125 degrees. The knee is stable within that range-of-motion to AP and ML stress. The alignment of the knee is 5 degrees varus. Muscle strength is normal. Pedal pulses are palpable. Hip examination was negative. The left knee motion is significantly reduced and does cause significant pain. The left knee moves from 0 to 125 degrees. The knee is stable within that range-of-motion to AP and ML stress. The alignment of the knee is 5 degrees varus. Muscle strength is normal. Pedal pulses are palpable. Hip examination was negative. [de-identified] : Long standing knee, AP knee, lateral knee, and patellar views of the bilateral knee were ordered and taken in the office and demonstrate degenerative joint disease of the knee with joint space narrowing, osteophyte formation, and subchondral sclerosis.

## 2021-03-09 ENCOUNTER — APPOINTMENT (OUTPATIENT)
Dept: OBGYN | Facility: CLINIC | Age: 66
End: 2021-03-09
Payer: COMMERCIAL

## 2021-03-09 VITALS
HEIGHT: 57 IN | SYSTOLIC BLOOD PRESSURE: 120 MMHG | WEIGHT: 124 LBS | DIASTOLIC BLOOD PRESSURE: 82 MMHG | BODY MASS INDEX: 26.75 KG/M2

## 2021-03-09 LAB
BILIRUB UR QL STRIP: NORMAL
CLARITY UR: CLEAR
COLLECTION METHOD: NORMAL
GLUCOSE UR-MCNC: NORMAL
HCG UR QL: 0.2 EU/DL
HGB UR QL STRIP.AUTO: NORMAL
KETONES UR-MCNC: NORMAL
LEUKOCYTE ESTERASE UR QL STRIP: NORMAL
NITRITE UR QL STRIP: NORMAL
PH UR STRIP: 7
PROT UR STRIP-MCNC: NORMAL
SP GR UR STRIP: 1.02

## 2021-03-09 PROCEDURE — 99397 PER PM REEVAL EST PAT 65+ YR: CPT | Mod: 25

## 2021-03-09 PROCEDURE — 99072 ADDL SUPL MATRL&STAF TM PHE: CPT

## 2021-03-09 PROCEDURE — 82274 ASSAY TEST FOR BLOOD FECAL: CPT | Mod: QW

## 2021-05-20 ENCOUNTER — APPOINTMENT (OUTPATIENT)
Dept: MAMMOGRAPHY | Facility: IMAGING CENTER | Age: 66
End: 2021-05-20
Payer: COMMERCIAL

## 2021-05-20 ENCOUNTER — RESULT REVIEW (OUTPATIENT)
Age: 66
End: 2021-05-20

## 2021-05-20 ENCOUNTER — OUTPATIENT (OUTPATIENT)
Dept: OUTPATIENT SERVICES | Facility: HOSPITAL | Age: 66
LOS: 1 days | End: 2021-05-20
Payer: COMMERCIAL

## 2021-05-20 DIAGNOSIS — Z00.00 ENCOUNTER FOR GENERAL ADULT MEDICAL EXAMINATION WITHOUT ABNORMAL FINDINGS: ICD-10-CM

## 2021-05-20 DIAGNOSIS — Z00.8 ENCOUNTER FOR OTHER GENERAL EXAMINATION: ICD-10-CM

## 2021-05-20 PROCEDURE — 77067 SCR MAMMO BI INCL CAD: CPT

## 2021-05-20 PROCEDURE — 77063 BREAST TOMOSYNTHESIS BI: CPT | Mod: 26

## 2021-05-20 PROCEDURE — 77067 SCR MAMMO BI INCL CAD: CPT | Mod: 26

## 2021-05-20 PROCEDURE — 77063 BREAST TOMOSYNTHESIS BI: CPT

## 2021-06-08 ENCOUNTER — APPOINTMENT (OUTPATIENT)
Dept: ORTHOPEDIC SURGERY | Facility: CLINIC | Age: 66
End: 2021-06-08
Payer: COMMERCIAL

## 2021-06-08 DIAGNOSIS — M17.11 UNILATERAL PRIMARY OSTEOARTHRITIS, RIGHT KNEE: ICD-10-CM

## 2021-06-08 PROCEDURE — 99072 ADDL SUPL MATRL&STAF TM PHE: CPT

## 2021-06-08 PROCEDURE — 99213 OFFICE O/P EST LOW 20 MIN: CPT

## 2021-06-08 RX ORDER — HYALURONATE SODIUM 20 MG/2 ML
20 SYRINGE (ML) INTRAARTICULAR DAILY
Qty: 2 | Refills: 0 | Status: ACTIVE | COMMUNITY
Start: 2021-06-08

## 2021-06-08 RX ORDER — HYALURONATE SODIUM 20 MG/2 ML
20 SYRINGE (ML) INTRAARTICULAR DAILY
Qty: 1 | Refills: 0 | Status: ACTIVE | COMMUNITY
Start: 2021-06-08

## 2021-06-10 NOTE — DISCUSSION/SUMMARY
[de-identified] : This patient has bilateral knee osteoarthritis.  The patient is not an appropriate candidate for surgical intervention at this time. An extensive discussion was conducted on the natural history of the disease and the variety of surgical and non-surgical options available to the patient including, but not limited to non-steroidal anti-inflammatory medications, steroid injections, physical therapy, maintenance of ideal body weight, and reduction of activity.  \par The patient will schedule an appointment as needed.\par \par We will get authorization for right knee Visco injections. \par \par

## 2021-06-10 NOTE — PHYSICAL EXAM
[de-identified] : Patient is well nourished, well-developed, in no acute distress, with appropriate mood and affect. The patient is oriented to time, place, and person. Respirations are even and unlabored. Gait evaluation does reveal a limp. There is no inguinal adenopathy. Bilateral limbs are well-perfused, without skin lesions, shows a grossly normal motor and sensory examination. The right knee motion is significantly reduced and does cause significant pain. The right knee moves from 0 to 125 degrees. The knee is stable within that range-of-motion to AP and ML stress. The alignment of the knee is 5 degrees varus. Muscle strength is normal. Pedal pulses are palpable. Hip examination was negative. The left knee motion is significantly reduced and does cause significant pain. The left knee moves from 0 to 125 degrees. The knee is stable within that range-of-motion to AP and ML stress. The alignment of the knee is 5 degrees varus. Muscle strength is normal. Pedal pulses are palpable. Hip examination was negative.

## 2021-06-10 NOTE — HISTORY OF PRESENT ILLNESS
[de-identified] : This is very nice 66-year-old female experiencing bilateral knee pain, which is severe in intensity. The pain substantially limits activities of daily living. Walking tolerance is reduced. Medication and activity modification have been minimally effective for a period lasting greater than three months in duration. Assistive devices and external support were not deemed by the patient to be helpful in improving their function.  She has tried intra-articular cortisone injections and hyaluronic acid injections in the past which helped for short period of time. She tried a Synvisc ONE injection which she feels made her worse.  She takes Advil or diclofenac at different times which do help for short period time.  She already has tried a course of physical therapy. Pain and restriction of function are intolerable at this time. The patient denies any radiation of the pain to the feet and it is not associated with numbness, tingling, or weakness.

## 2021-06-22 ENCOUNTER — APPOINTMENT (OUTPATIENT)
Dept: ORTHOPEDIC SURGERY | Facility: CLINIC | Age: 66
End: 2021-06-22
Payer: COMMERCIAL

## 2021-06-22 PROCEDURE — 99072 ADDL SUPL MATRL&STAF TM PHE: CPT

## 2021-06-22 PROCEDURE — 20610 DRAIN/INJ JOINT/BURSA W/O US: CPT | Mod: 50

## 2021-06-22 NOTE — PROCEDURE
[de-identified] : Injection: bilat knee joint. \par \par Indication: Osteoarthritis. \par \par A discussion was had with the patient regarding this procedure and all questions were answered. All risks, benefits and alternatives were discussed. These included but were not limited to bleeding, infection, and allergic reaction. Alcohol was used to clean the skin, and betadine was used to sterilize and prep the area in the anterior-medial aspect of the knee. Ethyl chloride spray was then used as a topical anesthetic. A 22-gauge needle was used to inject 2 cc of Euflexxa into the knee with ease. A sterile bandage was then applied. The patient tolerated the procedure well and there were no complications. \par \par Lot #:  [id number]\par Exp: 4-\par \par The INJECTION # one  of three Euflexxa injections was given today under sterile conditions into the knee joint without complication (see procedure note). I again discussed the role of activity modification/icing following the injection to treat any local irritation from the injection. \par \par

## 2021-06-29 ENCOUNTER — APPOINTMENT (OUTPATIENT)
Dept: ORTHOPEDIC SURGERY | Facility: CLINIC | Age: 66
End: 2021-06-29
Payer: COMMERCIAL

## 2021-06-29 PROCEDURE — 20610 DRAIN/INJ JOINT/BURSA W/O US: CPT | Mod: 50

## 2021-06-29 PROCEDURE — 99072 ADDL SUPL MATRL&STAF TM PHE: CPT

## 2021-06-29 NOTE — PROCEDURE
[de-identified] : Injection: bilat knee joint. \par \par Indication: Osteoarthritis. \par \par A discussion was had with the patient regarding this procedure and all questions were answered. All risks, benefits and alternatives were discussed. These included but were not limited to bleeding, infection, and allergic reaction. Alcohol was used to clean the skin, and betadine was used to sterilize and prep the area in the anterior-medial aspect of the knee. Ethyl chloride spray was then used as a topical anesthetic. A 22-gauge needle was used to inject 2 cc of Euflexxa into the knee with ease. A sterile bandage was then applied. The patient tolerated the procedure well and there were no complications. \par \par Lot #:  [id number]\par Exp: 4-\par \par The INJECTION # two  of three Euflexxa injections was given today under sterile conditions into the knee joint without complication (see procedure note). I again discussed the role of activity modification/icing following the injection to treat any local irritation from the injection. \par \par

## 2021-07-06 ENCOUNTER — APPOINTMENT (OUTPATIENT)
Dept: ORTHOPEDIC SURGERY | Facility: CLINIC | Age: 66
End: 2021-07-06
Payer: COMMERCIAL

## 2021-07-06 PROCEDURE — 20610 DRAIN/INJ JOINT/BURSA W/O US: CPT | Mod: 50

## 2021-07-06 PROCEDURE — 99072 ADDL SUPL MATRL&STAF TM PHE: CPT

## 2021-07-06 RX ORDER — NEOMYCIN SULFATE, POLYMYXIN B SULFATE AND DEXAMETHASONE 3.5; 10000; 1 MG/ML; [USP'U]/ML; MG/ML
3.5-10000-0.1 SUSPENSION OPHTHALMIC
Qty: 5 | Refills: 0 | Status: ACTIVE | COMMUNITY
Start: 2021-05-05

## 2021-07-06 NOTE — PROCEDURE
[de-identified] : Injection: bilat knee joint. \par \par Indication: Osteoarthritis. \par \par A discussion was had with the patient regarding this procedure and all questions were answered. All risks, benefits and alternatives were discussed. These included but were not limited to bleeding, infection, and allergic reaction. Alcohol was used to clean the skin, and betadine was used to sterilize and prep the area in the anterior-medial aspect of the knee. Ethyl chloride spray was then used as a topical anesthetic. A 22-gauge needle was used to inject 2 cc of Euflexxa into the knee with ease. A sterile bandage was then applied. The patient tolerated the procedure well and there were no complications. \par \par Lot #:  [id number]\par Exp: 4-\par \par The INJECTION # 3 of three Euflexxa injections was given today under sterile conditions into the knee joint without complication (see procedure note). I again discussed the role of activity modification/icing following the injection to treat any local irritation from the injection. \par \par

## 2021-07-19 ENCOUNTER — OUTPATIENT (OUTPATIENT)
Dept: OUTPATIENT SERVICES | Facility: HOSPITAL | Age: 66
LOS: 1 days | End: 2021-07-19
Payer: COMMERCIAL

## 2021-07-19 ENCOUNTER — APPOINTMENT (OUTPATIENT)
Dept: CARDIOLOGY | Facility: CLINIC | Age: 66
End: 2021-07-19

## 2021-07-19 DIAGNOSIS — E78.5 HYPERLIPIDEMIA, UNSPECIFIED: ICD-10-CM

## 2021-07-19 DIAGNOSIS — Z00.00 ENCOUNTER FOR GENERAL ADULT MEDICAL EXAMINATION WITHOUT ABNORMAL FINDINGS: ICD-10-CM

## 2021-07-19 PROCEDURE — 75571 CT HRT W/O DYE W/CA TEST: CPT

## 2021-07-19 PROCEDURE — 75571 CT HRT W/O DYE W/CA TEST: CPT | Mod: 26

## 2021-12-22 ENCOUNTER — EMERGENCY (EMERGENCY)
Facility: HOSPITAL | Age: 66
LOS: 1 days | Discharge: HOME | End: 2021-12-22
Attending: EMERGENCY MEDICINE | Admitting: EMERGENCY MEDICINE
Payer: COMMERCIAL

## 2021-12-22 VITALS
RESPIRATION RATE: 18 BRPM | HEART RATE: 71 BPM | WEIGHT: 121.25 LBS | DIASTOLIC BLOOD PRESSURE: 90 MMHG | SYSTOLIC BLOOD PRESSURE: 136 MMHG | OXYGEN SATURATION: 96 % | TEMPERATURE: 98 F | HEIGHT: 60 IN

## 2021-12-22 VITALS
SYSTOLIC BLOOD PRESSURE: 137 MMHG | DIASTOLIC BLOOD PRESSURE: 89 MMHG | RESPIRATION RATE: 14 BRPM | HEART RATE: 75 BPM | TEMPERATURE: 98 F | OXYGEN SATURATION: 98 %

## 2021-12-22 PROCEDURE — 73660 X-RAY EXAM OF TOE(S): CPT | Mod: 26,LT

## 2021-12-22 PROCEDURE — 99283 EMERGENCY DEPT VISIT LOW MDM: CPT

## 2021-12-22 PROCEDURE — 99283 EMERGENCY DEPT VISIT LOW MDM: CPT | Mod: 25

## 2021-12-22 PROCEDURE — 73660 X-RAY EXAM OF TOE(S): CPT

## 2021-12-22 NOTE — ED PROVIDER NOTE - OBJECTIVE STATEMENT
pt c/o left toe pain and swelling s/p injured. pt relates dropped something on her toe approx 1 week ago, symptoms havent resolved, so came to er today. no other inj. no weakness or numbness. pt declining pain meds. pt c/o left great toe pain and swelling s/p injured. pt relates dropped something on her toe approx 1 week ago, symptoms haven't resolved, so came to er today. no other inj. no weakness or numbness. pt declining pain meds.

## 2021-12-22 NOTE — ED ADULT TRIAGE NOTE - MEANS OF ARRIVAL
Infant remains on 2.5L HFNC. FiO2 21-25%. 3 a/b spells this shift. One needing tactile stim and the other 2 requiring tactile stim/increased oxygen to resolve. Frequent self resolving desats, usually associated with periodic breathing. Temps stable in isolette. N-pass score less than 3. Reflux precautions in place. Tolerating gavage feedings over 30 minutes. Joint compression completed x2. Weight up 25 grams. One stool. No contact with mom this shift. Continue to wean oxygen therapy as tolerated and monitor with current plan of care   ambulatory

## 2021-12-22 NOTE — ED PROVIDER NOTE - CARE PROVIDER_API CALL
Chai Cesar (DPM)  Podiatric Medicine and Surgery  [address] [place] [address]  Phone: [phone]  Fax: [phone]  Follow Up Time: 1-3 Days

## 2021-12-22 NOTE — ED PROVIDER NOTE - PATIENT PORTAL LINK FT
You can access the FollowMyHealth Patient Portal offered by [facility] by registering at the following website: [email or web address]. By joining [facility]’s FollowMyHealth portal, you will also be able to view your health information using other applications (apps) compatible with our system.

## 2021-12-22 NOTE — ED PROVIDER NOTE - MUSCULOSKELETAL, MLM
left foot great toe tender over distal phalanx only with mild swelling, no other tenderness, distal sensation intact, cap refill < 2 secs all toes

## 2022-01-05 ENCOUNTER — RX RENEWAL (OUTPATIENT)
Age: 67
End: 2022-01-05

## 2022-03-02 PROBLEM — I10 ESSENTIAL (PRIMARY) HYPERTENSION: Chronic | Status: ACTIVE | Noted: 2021-12-22

## 2022-03-15 ENCOUNTER — APPOINTMENT (OUTPATIENT)
Dept: ORTHOPEDIC SURGERY | Facility: CLINIC | Age: 67
End: 2022-03-15
Payer: COMMERCIAL

## 2022-03-15 VITALS
HEART RATE: 65 BPM | BODY MASS INDEX: 25.46 KG/M2 | DIASTOLIC BLOOD PRESSURE: 79 MMHG | SYSTOLIC BLOOD PRESSURE: 135 MMHG | HEIGHT: 57 IN | WEIGHT: 118 LBS

## 2022-03-15 PROCEDURE — 73564 X-RAY EXAM KNEE 4 OR MORE: CPT | Mod: 50

## 2022-03-15 PROCEDURE — 99214 OFFICE O/P EST MOD 30 MIN: CPT

## 2022-03-15 RX ORDER — MELOXICAM 15 MG/1
15 TABLET ORAL
Qty: 30 | Refills: 2 | Status: ACTIVE | COMMUNITY
Start: 2022-03-15 | End: 1900-01-01

## 2022-03-15 NOTE — DISCUSSION/SUMMARY
[de-identified] : This patient has bilateral knee osteoarthritis.  She has failed a course of conservative management and would like to proceed with a left total knee arthroplasty using robotic navigation for assistance.  After allowing a minimum of 3 months for healing she would then like to proceed with a staged right total knee arthroplasty.\par \par The patient is an appropriate candidate for consideration of left total knee replacement. An extensive discussion was conducted of the natural history of the disease and the variety of surgical and non-surgical treatment options available to the patient. A risk/benefit analysis was discussed with the patient reviewing the advantages and disadvantages of surgical intervention at this time. Both the level and length of the patient's pain have made additional conservative treatment measures consisting of NSAIDs, physical therapy, corticosteroids, and/or viscosupplementation contraindicated. A full explanation was given of the nature and the purpose of the procedure and anesthesia, its benefits, possible alternative methods of diagnosis or treatment, the risks involved, the possibility of complications, the foreseeable consequences of the procedure and the possible results of the non-treatment. I reviewed the plan of care as well as used a model of a total knee implant equivalent to the one that will be used for their total knee joint replacement. The ability to secure the implant utilizing cement or cementless (press-fit) was discussed with the patient. The patient agrees with the plan of care, as well as the use of implants for their total knee replacement.   We also discussed that if robotic/computer navigation is utilized, then additional incisions may need to be made to accommodate the computer navigation arrays, which will be placed in the femur and tibia.\par \par No guarantee or assurance was made as to the results that may be obtained. Specifically, the risks were identified to include, but are not limited to the following: Infection, phlebitis, pulmonary embolism, death, paralysis, dislocation, pain, stiffness, instability, limp, weakness, breakage, leg-length inequality, uncontrolled bleeding, nerve injury, blood vessel injury, pressure sores, anesthetic risks, delayed healing of wound and bone, and wear and loosening. Further discussion was undertaken with the patient about the details of surgical preparation, treatment, and postoperative rehabilitation including medical clearance, autotransfusion, the hospital course, and the postoperative rehabilitation involved. All in all, I feel that this patient is a good candidate for surgical reconstruction.\par \par The patient and I discussed the current SARS-CoV-2 (COVID-19) pandemic which has affected our local hospitals. We discussed that our hospitals treat patients with COVID-19. All efforts will be made to avoid cohorting the patient with diagnosed or suspected COVID-19 patient. They also understand that we will screen them 24-48 hours prior to surgery. Despite our best efforts, there is a potential risk for iatrogenic transmission of COVID-19 to the patient during the perioperative period. Robbi COVID-19 during the perioperative period may increase the patient´s risks of an adverse outcome including postoperative pneumonia, difficulty breathing, requirement for a breathing tube (general endotracheal intubation), and death. The patient is understanding of this risk, and is willing to proceed with surgery at this time.\par \par The patient and I discussed the option for 23 hour stay joint replacement. They will stay overnight in the hospital after surgery and will discharge home on the next day of surgery. The risks and benefits of this type of rapid recovery protocol was reviewed with the patient and they are in agreement with proceeding with 23 hour stay joint replacement surgery. They understand that in the event of an emergency, that they will be transferred to the main hospital for inpatient care.

## 2022-03-15 NOTE — HISTORY OF PRESENT ILLNESS
[de-identified] : This is very nice 67-year-old female experiencing bilateral knee pain, which is severe in intensity. She has known bilateral knee oa. The pain substantially limits activities of daily living. Walking tolerance is reduced. Medication and activity modification have been minimally effective for a period lasting greater than three months in duration. Assistive devices and external support were not deemed by the patient to be helpful in improving their function.  She has tried intra-articular cortisone injections and hyaluronic acid injections in the past which helped for short period of time. She tried a Synvisc ONE injection which she feels made her worse. Euflexxa helps.  She takes Advil or diclofenac at different times which do help for short period time.  She already has tried a course of physical therapy. Pain and restriction of function are intolerable at this time. The patient denies any radiation of the pain to the feet and it is not associated with numbness, tingling, or weakness.

## 2022-03-15 NOTE — PHYSICAL EXAM
[de-identified] : Patient is well nourished, well-developed, in no acute distress, with appropriate mood and affect. The patient is oriented to time, place, and person. Respirations are even and unlabored. Gait evaluation does reveal a limp. There is no inguinal adenopathy. Bilateral limbs are well-perfused, without skin lesions, shows a grossly normal motor and sensory examination. The right knee motion is significantly reduced and does cause significant pain. The right knee moves from 10 to 115 degrees. The knee is stable within that range-of-motion to AP and ML stress. The alignment of the knee is 5 degrees varus. Muscle strength is normal. Pedal pulses are palpable. Hip examination was negative. The left knee motion is significantly reduced and does cause significant pain. The left knee moves from 0 to 120 degrees. The knee is stable within that range-of-motion to AP and ML stress. The alignment of the knee is 5 degrees varus. Muscle strength is normal. Pedal pulses are palpable. Hip examination was negative. [de-identified] : Long standing knee, AP knee, lateral knee, and patellar views of the bilateral knee were ordered and taken in the office and demonstrate severe degenerative joint disease of the knee with joint space narrowing, osteophyte formation, and subchondral sclerosis.

## 2022-07-08 NOTE — ED ADULT NURSE NOTE - HOW MANY DRINKS CONTAINING ALCOHOL DO YOU HAVE ON A TYPICAL DAY WHEN YOU ARE DRINKING?
Discharge plan reviewed  Patient tolerated Covid Monoclonal injection well  Patient remained on unit for 1 hour post administration of bebtelovimab. No complications. D/c in stable condition with instructions. Educated on adverse signs/symptoms to report to physician. Instructed to notify their PCP or proceed to the emergency room for any worsening symptoms. Patient verbalizes understanding. Discharged from infusion services. 1 or 2

## 2022-08-15 ENCOUNTER — RX RENEWAL (OUTPATIENT)
Age: 67
End: 2022-08-15

## 2022-10-01 RX ORDER — MELOXICAM 7.5 MG/1
7.5 TABLET ORAL
Qty: 30 | Refills: 1 | Status: ACTIVE | COMMUNITY
Start: 2022-10-01 | End: 1900-01-01

## 2022-12-19 ENCOUNTER — RX RENEWAL (OUTPATIENT)
Age: 67
End: 2022-12-19

## 2022-12-21 ENCOUNTER — APPOINTMENT (OUTPATIENT)
Dept: OBGYN | Facility: CLINIC | Age: 67
End: 2022-12-21

## 2022-12-21 VITALS
DIASTOLIC BLOOD PRESSURE: 79 MMHG | BODY MASS INDEX: 25.89 KG/M2 | SYSTOLIC BLOOD PRESSURE: 129 MMHG | HEIGHT: 57 IN | WEIGHT: 120 LBS

## 2022-12-21 DIAGNOSIS — E74.39 OTHER DISORDERS OF INTESTINAL CARBOHYDRATE ABSORPTION: ICD-10-CM

## 2022-12-21 DIAGNOSIS — N90.5 ATROPHY OF VULVA: ICD-10-CM

## 2022-12-21 PROCEDURE — 99397 PER PM REEVAL EST PAT 65+ YR: CPT

## 2022-12-21 PROCEDURE — 82270 OCCULT BLOOD FECES: CPT

## 2022-12-21 PROCEDURE — 77080 DXA BONE DENSITY AXIAL: CPT

## 2023-02-28 LAB
ESTIMATED AVERAGE GLUCOSE: 120 MG/DL
HBA1C MFR BLD HPLC: 5.8 %

## 2023-03-04 ENCOUNTER — APPOINTMENT (OUTPATIENT)
Dept: CARDIOLOGY | Facility: CLINIC | Age: 68
End: 2023-03-04

## 2023-03-06 ENCOUNTER — RX RENEWAL (OUTPATIENT)
Age: 68
End: 2023-03-06

## 2023-03-13 ENCOUNTER — APPOINTMENT (OUTPATIENT)
Dept: CARDIOLOGY | Facility: CLINIC | Age: 68
End: 2023-03-13
Payer: COMMERCIAL

## 2023-03-13 DIAGNOSIS — R01.1 CARDIAC MURMUR, UNSPECIFIED: ICD-10-CM

## 2023-03-13 DIAGNOSIS — Z86.79 PERSONAL HISTORY OF OTHER DISEASES OF THE CIRCULATORY SYSTEM: ICD-10-CM

## 2023-03-13 PROCEDURE — 93306 TTE W/DOPPLER COMPLETE: CPT

## 2023-03-15 ENCOUNTER — TRANSCRIPTION ENCOUNTER (OUTPATIENT)
Age: 68
End: 2023-03-15

## 2023-03-19 ENCOUNTER — NON-APPOINTMENT (OUTPATIENT)
Age: 68
End: 2023-03-19

## 2023-03-19 PROBLEM — Z86.79 HISTORY OF CARDIAC MURMUR: Status: RESOLVED | Noted: 2023-03-10 | Resolved: 2023-03-19

## 2023-03-19 PROBLEM — R01.1 MURMUR: Status: ACTIVE | Noted: 2023-03-19

## 2023-03-23 ENCOUNTER — APPOINTMENT (OUTPATIENT)
Dept: ORTHOPEDIC SURGERY | Facility: CLINIC | Age: 68
End: 2023-03-23
Payer: COMMERCIAL

## 2023-03-23 VITALS — WEIGHT: 120 LBS | BODY MASS INDEX: 25.89 KG/M2 | HEIGHT: 57 IN

## 2023-03-23 PROCEDURE — 99215 OFFICE O/P EST HI 40 MIN: CPT

## 2023-03-23 PROCEDURE — 73564 X-RAY EXAM KNEE 4 OR MORE: CPT | Mod: 50

## 2023-03-23 NOTE — DISCUSSION/SUMMARY
[de-identified] : This patient has bilateral knee osteoarthritis.  She has failed a course of conservative management and would like to proceed with a left total knee arthroplasty using robotic navigation for assistance.  After allowing a minimum of 3 months for healing she would then like to proceed with a staged right total knee arthroplasty.\par \par The patient is an appropriate candidate for consideration of left total knee replacement. An extensive discussion was conducted of the natural history of the disease and the variety of surgical and non-surgical treatment options available to the patient. A risk/benefit analysis was discussed with the patient reviewing the advantages and disadvantages of surgical intervention at this time. Both the level and length of the patient's pain have made additional conservative treatment measures consisting of NSAIDs, physical therapy, corticosteroids, and/or viscosupplementation contraindicated. A full explanation was given of the nature and the purpose of the procedure and anesthesia, its benefits, possible alternative methods of diagnosis or treatment, the risks involved, the possibility of complications, the foreseeable consequences of the procedure and the possible results of the non-treatment. I reviewed the plan of care as well as used a model of a total knee implant equivalent to the one that will be used for their total knee joint replacement. The ability to secure the implant utilizing cement or cementless (press-fit) was discussed with the patient. The patient agrees with the plan of care, as well as the use of implants for their total knee replacement.   We also discussed that if robotic/computer navigation is utilized, then additional incisions may need to be made to accommodate the computer navigation arrays, which will be placed in the femur and tibia.\par \par No guarantee or assurance was made as to the results that may be obtained. Specifically, the risks were identified to include, but are not limited to the following: Infection, phlebitis, pulmonary embolism, death, paralysis, dislocation, pain, stiffness, instability, limp, weakness, breakage, leg-length inequality, uncontrolled bleeding, nerve injury, blood vessel injury, pressure sores, anesthetic risks, delayed healing of wound and bone, and wear and loosening. Further discussion was undertaken with the patient about the details of surgical preparation, treatment, and postoperative rehabilitation including medical clearance, autotransfusion, the hospital course, and the postoperative rehabilitation involved. All in all, I feel that this patient is a good candidate for surgical reconstruction.\par \par The patient and I discussed the current SARS-CoV-2 (COVID-19) pandemic which has affected our local hospitals. We discussed that our hospitals treat patients with COVID-19. All efforts will be made to avoid cohorting the patient with diagnosed or suspected COVID-19 patient. They also understand that we will screen them 24-48 hours prior to surgery. Despite our best efforts, there is a potential risk for iatrogenic transmission of COVID-19 to the patient during the perioperative period. Robbi COVID-19 during the perioperative period may increase the patient´s risks of an adverse outcome including postoperative pneumonia, difficulty breathing, requirement for a breathing tube (general endotracheal intubation), and death. The patient is understanding of this risk, and is willing to proceed with surgery at this time.

## 2023-03-23 NOTE — PHYSICAL EXAM
[de-identified] : Patient is well nourished, well-developed, in no acute distress, with appropriate mood and affect. The patient is oriented to time, place, and person. Respirations are even and unlabored. Gait evaluation does reveal a limp. There is no inguinal adenopathy. Bilateral limbs are well-perfused, without skin lesions, shows a grossly normal motor and sensory examination. The right knee motion is significantly reduced and does cause significant pain. The right knee moves from 10 to 115 degrees. The knee is stable within that range-of-motion to AP and ML stress. The alignment of the knee is 5 degrees varus. Muscle strength is normal. Pedal pulses are palpable. Hip examination was negative. The left knee motion is significantly reduced and does cause significant pain. The left knee moves from 0 to 120 degrees. The knee is stable within that range-of-motion to AP and ML stress. The alignment of the knee is 5 degrees varus. Muscle strength is normal. Pedal pulses are palpable. Hip examination was negative. [de-identified] : Long standing knee, AP knee, lateral knee, and patellar views of the bilateral knee were ordered and taken in the office and demonstrate severe degenerative joint disease of the knee with joint space narrowing, osteophyte formation, and subchondral sclerosis.

## 2023-03-23 NOTE — HISTORY OF PRESENT ILLNESS
[de-identified] : This is very nice 68-year-old female experiencing bilateral knee pain, which is severe in intensity. L>R. She has known bilateral knee oa. The pain substantially limits activities of daily living. Walking tolerance is reduced. Medication and activity modification have been minimally effective for a period lasting greater than three months in duration. Assistive devices and external support were not deemed by the patient to be helpful in improving their function.  She has tried intra-articular cortisone injections and hyaluronic acid injections in the past which helped for short period of time. She tried a Synvisc ONE injection which she feels made her worse. Euflexxa helps.  She takes Advil or diclofenac at different times which do help for short period time.  She already has tried a course of physical therapy. Pain and restriction of function are intolerable at this time. The patient denies any radiation of the pain to the feet and it is not associated with numbness, tingling, or weakness. Previously indicated for L TKA but became scared prior to surgery and cancelled last minute.

## 2023-04-13 ENCOUNTER — NON-APPOINTMENT (OUTPATIENT)
Age: 68
End: 2023-04-13

## 2023-04-15 ENCOUNTER — NON-APPOINTMENT (OUTPATIENT)
Age: 68
End: 2023-04-15

## 2023-05-02 ENCOUNTER — APPOINTMENT (OUTPATIENT)
Dept: PULMONOLOGY | Facility: CLINIC | Age: 68
End: 2023-05-02
Payer: COMMERCIAL

## 2023-05-02 VITALS
HEIGHT: 57 IN | BODY MASS INDEX: 25.89 KG/M2 | OXYGEN SATURATION: 97 % | WEIGHT: 120 LBS | TEMPERATURE: 96.4 F | SYSTOLIC BLOOD PRESSURE: 124 MMHG | DIASTOLIC BLOOD PRESSURE: 80 MMHG | RESPIRATION RATE: 15 BRPM | HEART RATE: 77 BPM

## 2023-05-02 DIAGNOSIS — J45.909 UNSPECIFIED ASTHMA, UNCOMPLICATED: ICD-10-CM

## 2023-05-02 DIAGNOSIS — Z80.0 FAMILY HISTORY OF MALIGNANT NEOPLASM OF DIGESTIVE ORGANS: ICD-10-CM

## 2023-05-02 DIAGNOSIS — K21.9 GASTRO-ESOPHAGEAL REFLUX DISEASE W/OUT ESOPHAGITIS: ICD-10-CM

## 2023-05-02 DIAGNOSIS — Z77.22 CONTACT WITH AND (SUSPECTED) EXPOSURE TO ENVIRONMENTAL TOBACCO SMOKE (ACUTE) (CHRONIC): ICD-10-CM

## 2023-05-02 DIAGNOSIS — R06.83 SNORING: ICD-10-CM

## 2023-05-02 DIAGNOSIS — R06.02 SHORTNESS OF BREATH: ICD-10-CM

## 2023-05-02 DIAGNOSIS — J30.9 ALLERGIC RHINITIS, UNSPECIFIED: ICD-10-CM

## 2023-05-02 PROCEDURE — 99204 OFFICE O/P NEW MOD 45 MIN: CPT | Mod: 25

## 2023-05-02 PROCEDURE — 94727 GAS DIL/WSHOT DETER LNG VOL: CPT

## 2023-05-02 PROCEDURE — 71046 X-RAY EXAM CHEST 2 VIEWS: CPT

## 2023-05-02 PROCEDURE — 94729 DIFFUSING CAPACITY: CPT

## 2023-05-02 PROCEDURE — 95012 NITRIC OXIDE EXP GAS DETER: CPT

## 2023-05-02 PROCEDURE — 94618 PULMONARY STRESS TESTING: CPT

## 2023-05-02 PROCEDURE — ZZZZZ: CPT

## 2023-05-02 PROCEDURE — 94010 BREATHING CAPACITY TEST: CPT

## 2023-05-02 RX ORDER — FAMOTIDINE 40 MG/1
40 TABLET, FILM COATED ORAL
Qty: 90 | Refills: 1 | Status: ACTIVE | COMMUNITY
Start: 2023-05-02 | End: 1900-01-01

## 2023-05-02 NOTE — PROCEDURE
[FreeTextEntry1] : CXR reveals a normal sized heart; no evidence of infiltrate or effusion; mildly elevated R hemidiaphragm\par \par Full PFT reveals normal flows; FEV1 was 2.37 L which is 139% of predicted ; normal lung volumes; normal diffusion at 21.1, which is 141% of predicted; normal flow volume loop.\par PFTs were performed to evaluate for asthma, sob\par \par ECHO (3.1.3. 2023) revealed LVEF 65-70% mild MR mild TR \par \par 6 minute walk test reveals a low saturation of 94% with slight dyspnea or fatigue; walked 472.7 meters \par \par FENO was 25; a normal value being less than 25\par Fractional exhaled nitric oxide (FENO) is regarded as a simple, noninvasive method for assessing eosinophilic airway inflammation. Produced by a variety of cells within the lung, nitric oxide (NO) concentrations are generally low in healthy individuals. However, high concentrations of NO appear to be involved in nonspecific host defense mechanisms and chronic inflammatory diseases such as asthma. The American Thoracic Society (ATS) therefore has recommended using FENO to aid in the diagnosis and monitoring of eosinophilic airway inflammation and asthma, and for identifying steroid responsive individuals whose chronic respiratory symptoms may be caused by airway inflammation.

## 2023-05-02 NOTE — HISTORY OF PRESENT ILLNESS
[TextBox_4] : Ms. SYLVESTER is a 68 year old female originally from [place] with a history of arthritis, HTN, heart murmur, ovarian cyst s/p hysterectomy, OA, HLD, allergy, myositis, COVID-19 2021 presenting to the office today for an initial pulmonary evaluation. Her chief complaint is\par \par -she notes knee arthralgias, pending surgery\par -she notes recent illness (cough) 3/2023\par -she notes she was negative for everything on RVP\par -she notes she took augmentin from [facility] and then got pink eye which she was also prescribed Abx for\par -she notes dysphagia and swollen submandibular glands during illness\par -she notes still on ocular Abx\par -she notes persistent cough in [place]\par -she notes intermittent cough persists\par -she notes active allergies\par -she notes sinuses are active\par -she notes intermittent dyspnea\par -she notes energy levels are poor \par -she notes snoring\par -she notes weight is stable \par -she notes her memory and concentration are stable \par -she notes exercising (in pool)\par -she denies exercising recently due to illness\par -she notes bowels are regular \par -she notes intermittent nocturia\par -she denies ability to fall asleep while watching TV or while in a car\par -she notes rare muscle cramps\par -she notes on magnesium supplement\par \par -she denies any headaches, nausea, emesis, fever, chills, sweats, chest pain, chest pressure, wheezing, palpitations, constipation, diarrhea, vertigo, heartburn, reflux, itchy ears, leg swelling, leg pain, or sour taste in the mouth.

## 2023-05-02 NOTE — ADDENDUM
[FreeTextEntry1] : Documented by BRIONNA Flynn acting as a scribe for Dr. Yung Bae on 05/02/2023 .\par \par All medical record entries made by the Scribe were at my, Dr. Yung Bae's, direction and personally dictated by me on 05/02/2023. I have reviewed the chart and agree that the record accurately reflects my personal performance of the history, physical exam, assessment and plan. I have also personally directed, reviewed, and agree with the discharge instructions.

## 2023-05-02 NOTE — ASSESSMENT
[FreeTextEntry1] : Ms. SYLVESTER is a 68 year old female originally from [place] with a history of arthritis, HTN, heart murmur, ovarian cyst s/p hysterectomy, OA, HLD, allergy, myositis, COVID-19 2021 presenting to the office today for an initial pulmonary evaluation for SOB, post URI bronchospasm, allergy, GERD, ?LUCAS\par \par Her shortness of breath is multifactorial due to:\par -poor mechanics of breathing \par -out of shape \par -pulmonary disease\par   - post URI bronchospasm/ RADS\par -cardiac disease (Jaxson)\par    -doubtful \par \par problem 1: RADS/ bronchospasm \par -add Advair 250 1 inhalation BID \par -Asthma is believed to be caused by inherited (genetic) and environmental factor, but its exact cause is unknown. Asthma may be triggered by allergens, lung infections, or irritants in the air. Asthma triggers are different for each person. \par -Inhaler technique reviewed as well as oral hygiene techniques reviewed with patient. Avoidance of cold air, extremes of temperature; rescue inhaler should be used before exercise. Order of medication reviewed with patient. Recommended use of a cool mist humidifier in the bedroom. \par \par problem 2: allergy\par -add Ryaltris 1 sniff BID \par -complete blood work: asthma panel, food IgE panel, IgE level, eosinophil level, vitamin D level \par -Environmental measures for allergies were encouraged including mattress and pillow covers, air purifier, and environmental controls. \par \par Problem 3: GERD\par -add Pepcid 40 mg QHS\par -Rule of 2s: avoid eating too much, eating too late, eating too spicy, eating two hours before bed.\par -Things to avoid including overeating, spicy foods, tight clothing, eating within three hours of bed, this list is not all inclusive.\par -For treatment of reflux, possible options discussed including diet control, H2 blockers, PPIs, as well as coating motility agents discussed as treatment options. Timing of meals and proximity of last meal to sleep were discussed. If symptoms persist, a formal gastrointestinal evaluation is needed. \par \par Problem 4: LUCAS\par -complete home sleep study \par -Sleep apnea is associated with adverse clinical consequences which can affect most organ systems.\par Cardiovascular disease risk includes arrhythmias, atrial fibrillation, hypertension, coronary artery disease, and stroke. Metabolic disorders include diabetes type 2, non-alcoholic fatty liver disease. Mood disorder especially depression; and cognitive decline especially in the elderly. Associations with chronic reflux/Wilcox’s esophagus some but not all inclusive.\par -Reasons include arousal consistent with hypopnea; respiratory events most prominent in REM sleep or supine position; therefore sleep staging and body position are important for accurate diagnosis and estimation of AHI. \par \par problem 5: cardiac disease\par -recommended to continue to follow up with Cardiologist (Jaxson)\par \par problem 6 : poor breathing mechanics\par -Proper breathing techniques were reviewed with an emphasis of exhalation. Patient instructed to breath in for 1 second and out for four seconds. Patient was encouraged to not talk while walking. \par \par problem 7 : out of shape\par -Weight loss, exercise, and diet control were discussed and are highly encouraged. Treatment options are given such as, aqua therapy, and contacting a nutritionist. Recommended to use the elliptical, stationary bike, less use of treadmill. \par \par problem 8: health maintenance \par -recommended yearly flu shot after October 15\par -recommended strep pneumonia vaccines: Prevnar-20 vaccine, followed by Pneumo vaccine 23 one year following after 65 years old. \par -recommended early intervention for Upper Respiratory Infections (URIs)\par -recommended regular osteoporosis evaluations\par -recommended early dermatological evaluations\par -recommended after the age of 50 to the age of 70, colonoscopy every 5 years\par \par F/U in 6-8 weeks.\par She is encouraged to call with any changes, concerns, or questions

## 2023-05-03 ENCOUNTER — NON-APPOINTMENT (OUTPATIENT)
Age: 68
End: 2023-05-03

## 2023-05-04 DIAGNOSIS — U07.1 COVID-19: ICD-10-CM

## 2023-05-16 RX ORDER — OLOPATADINE HYDROCHLORIDE AND MOMETASONE FUROATE 25; 665 UG/1; UG/1
665-25 SPRAY, METERED NASAL
Qty: 3 | Refills: 1 | Status: ACTIVE | COMMUNITY
Start: 2023-05-02 | End: 1900-01-01

## 2023-05-20 ENCOUNTER — LABORATORY RESULT (OUTPATIENT)
Age: 68
End: 2023-05-20

## 2023-05-20 LAB — 25(OH)D3 SERPL-MCNC: 50.3 NG/ML

## 2023-05-21 LAB — 24R-OH-CALCIDIOL SERPL-MCNC: 93.2 PG/ML

## 2023-05-22 ENCOUNTER — TRANSCRIPTION ENCOUNTER (OUTPATIENT)
Age: 68
End: 2023-05-22

## 2023-05-22 LAB
A ALTERNATA IGE QN: <0.1 KUA/L
A ALTERNATA IGE QN: <0.1 KUA/L
A FUMIGATUS IGE QN: <0.1 KUA/L
A FUMIGATUS IGE QN: <0.1 KUA/L
BERMUDA GRASS IGE QN: <0.1 KUA/L
BOXELDER IGE QN: <0.1 KUA/L
C ALBICANS IGE QN: <0.1 KUA/L
C HERBARUM IGE QN: <0.1 KUA/L
C HERBARUM IGE QN: <0.1 KUA/L
CALIF WALNUT IGE QN: <0.1 KUA/L
CAT DANDER IGE QN: <0.1 KUA/L
CAT DANDER IGE QN: <0.1 KUA/L
CLAM IGE QN: <0.1 KUA/L
CMN PIGWEED IGE QN: <0.1 KUA/L
CODFISH IGE QN: <0.1 KUA/L
COMMON RAGWEED IGE QN: <0.1 KUA/L
COMMON RAGWEED IGE QN: <0.1 KUA/L
CORN IGE QN: <0.1 KUA/L
COTTONWOOD IGE QN: <0.1 KUA/L
COW MILK IGE QN: <0.1 KUA/L
D FARINAE IGE QN: 3.2 KUA/L
D FARINAE IGE QN: 3.2 KUA/L
D PTERONYSS IGE QN: 4.76 KUA/L
D PTERONYSS IGE QN: 4.76 KUA/L
DEPRECATED A ALTERNATA IGE RAST QL: 0
DEPRECATED A ALTERNATA IGE RAST QL: 0
DEPRECATED A FUMIGATUS IGE RAST QL: 0
DEPRECATED A FUMIGATUS IGE RAST QL: 0
DEPRECATED BERMUDA GRASS IGE RAST QL: 0
DEPRECATED BOXELDER IGE RAST QL: 0
DEPRECATED C ALBICANS IGE RAST QL: 0
DEPRECATED C HERBARUM IGE RAST QL: 0
DEPRECATED C HERBARUM IGE RAST QL: 0
DEPRECATED CAT DANDER IGE RAST QL: 0
DEPRECATED CAT DANDER IGE RAST QL: 0
DEPRECATED CLAM IGE RAST QL: 0
DEPRECATED CODFISH IGE RAST QL: 0
DEPRECATED COMMON PIGWEED IGE RAST QL: 0
DEPRECATED COMMON RAGWEED IGE RAST QL: 0
DEPRECATED COMMON RAGWEED IGE RAST QL: 0
DEPRECATED CORN IGE RAST QL: 0
DEPRECATED COTTONWOOD IGE RAST QL: 0
DEPRECATED COW MILK IGE RAST QL: 0
DEPRECATED D FARINAE IGE RAST QL: 2
DEPRECATED D FARINAE IGE RAST QL: 2
DEPRECATED D PTERONYSS IGE RAST QL: 3
DEPRECATED D PTERONYSS IGE RAST QL: 3
DEPRECATED DOG DANDER IGE RAST QL: 0
DEPRECATED DOG DANDER IGE RAST QL: 0
DEPRECATED DUCK FEATHER IGE RAST QL: 0
DEPRECATED EGG WHITE IGE RAST QL: 0
DEPRECATED GOOSE FEATHER IGE RAST QL: 0
DEPRECATED GOOSEFOOT IGE RAST QL: 0
DEPRECATED LONDON PLANE IGE RAST QL: 0
DEPRECATED M RACEMOSUS IGE RAST QL: 0
DEPRECATED MOUSE URINE PROT IGE RAST QL: 0
DEPRECATED MUGWORT IGE RAST QL: NORMAL
DEPRECATED P NOTATUM IGE RAST QL: 0
DEPRECATED PEANUT IGE RAST QL: 0
DEPRECATED RED CEDAR IGE RAST QL: 0
DEPRECATED ROACH IGE RAST QL: 0
DEPRECATED ROACH IGE RAST QL: 0
DEPRECATED SCALLOP IGE RAST QL: <0.1 KUA/L
DEPRECATED SESAME SEED IGE RAST QL: 0
DEPRECATED SHEEP SORREL IGE RAST QL: 0
DEPRECATED SHRIMP IGE RAST QL: 0
DEPRECATED SILVER BIRCH IGE RAST QL: 0
DEPRECATED SOYBEAN IGE RAST QL: 0
DEPRECATED TIMOTHY IGE RAST QL: 0
DEPRECATED TIMOTHY IGE RAST QL: 0
DEPRECATED WALNUT IGE RAST QL: 0
DEPRECATED WHEAT IGE RAST QL: 0
DEPRECATED WHITE ASH IGE RAST QL: 0
DEPRECATED WHITE OAK IGE RAST QL: 0
DEPRECATED WHITE OAK IGE RAST QL: 0
DOG DANDER IGE QN: <0.1 KUA/L
DOG DANDER IGE QN: <0.1 KUA/L
DUCK FEATHER IGE QN: <0.1 KUA/L
EGG WHITE IGE QN: <0.1 KUA/L
GOOSE FEATHER IGE QN: <0.1 KUA/L
GOOSEFOOT IGE QN: <0.1 KUA/L
LONDON PLANE IGE QN: <0.1 KUA/L
M RACEMOSUS IGE QN: <0.1 KUA/L
MOUSE URINE PROT IGE QN: <0.1 KUA/L
MUGWORT IGE QN: 0.11 KUA/L
MULBERRY (T70) CLASS: 0
MULBERRY (T70) CONC: <0.1 KUA/L
P NOTATUM IGE QN: <0.1 KUA/L
PEANUT IGE QN: <0.1 KUA/L
RED CEDAR IGE QN: <0.1 KUA/L
ROACH IGE QN: <0.1 KUA/L
ROACH IGE QN: <0.1 KUA/L
SCALLOP IGE QN: 0
SCALLOP IGE QN: <0.1 KUA/L
SESAME SEED IGE QN: <0.1 KUA/L
SHEEP SORREL IGE QN: <0.1 KUA/L
SILVER BIRCH IGE QN: <0.1 KUA/L
SOYBEAN IGE QN: <0.1 KUA/L
TIMOTHY IGE QN: <0.1 KUA/L
TIMOTHY IGE QN: <0.1 KUA/L
TOTAL IGE SMQN RAST: 100 KU/L
TREE ALLERG MIX1 IGE QL: 0
WALNUT IGE QN: <0.1 KUA/L
WHEAT IGE QN: <0.1 KUA/L
WHITE ASH IGE QN: <0.1 KUA/L
WHITE ELM IGE QN: 0
WHITE ELM IGE QN: <0.1 KUA/L
WHITE OAK IGE QN: <0.1 KUA/L
WHITE OAK IGE QN: <0.1 KUA/L

## 2023-05-24 ENCOUNTER — RX RENEWAL (OUTPATIENT)
Age: 68
End: 2023-05-24

## 2023-05-26 ENCOUNTER — NON-APPOINTMENT (OUTPATIENT)
Age: 68
End: 2023-05-26

## 2023-06-27 ENCOUNTER — NON-APPOINTMENT (OUTPATIENT)
Age: 68
End: 2023-06-27

## 2023-07-16 ENCOUNTER — TRANSCRIPTION ENCOUNTER (OUTPATIENT)
Age: 68
End: 2023-07-16

## 2023-07-17 ENCOUNTER — TRANSCRIPTION ENCOUNTER (OUTPATIENT)
Age: 68
End: 2023-07-17

## 2023-07-17 ENCOUNTER — APPOINTMENT (OUTPATIENT)
Dept: ORTHOPEDIC SURGERY | Facility: HOSPITAL | Age: 68
End: 2023-07-17

## 2023-07-17 ENCOUNTER — INPATIENT (INPATIENT)
Facility: HOSPITAL | Age: 68
LOS: 0 days | Discharge: HOME | DRG: 470 | End: 2023-07-18
Attending: ORTHOPAEDIC SURGERY | Admitting: ORTHOPAEDIC SURGERY
Payer: COMMERCIAL

## 2023-07-17 VITALS
DIASTOLIC BLOOD PRESSURE: 77 MMHG | SYSTOLIC BLOOD PRESSURE: 126 MMHG | RESPIRATION RATE: 18 BRPM | HEART RATE: 66 BPM | WEIGHT: 121.92 LBS | OXYGEN SATURATION: 98 % | TEMPERATURE: 98 F | HEIGHT: 59 IN

## 2023-07-17 DIAGNOSIS — N83.209 UNSPECIFIED OVARIAN CYST, UNSPECIFIED SIDE: Chronic | ICD-10-CM

## 2023-07-17 DIAGNOSIS — M17.12 UNILATERAL PRIMARY OSTEOARTHRITIS, LEFT KNEE: ICD-10-CM

## 2023-07-17 DIAGNOSIS — Z90.710 ACQUIRED ABSENCE OF BOTH CERVIX AND UTERUS: Chronic | ICD-10-CM

## 2023-07-17 PROBLEM — M19.90 UNSPECIFIED OSTEOARTHRITIS, UNSPECIFIED SITE: Chronic | Status: ACTIVE | Noted: 2023-06-26

## 2023-07-17 PROCEDURE — 27447 TOTAL KNEE ARTHROPLASTY: CPT | Mod: LT

## 2023-07-17 PROCEDURE — 20985 CPTR-ASST DIR MS PX: CPT

## 2023-07-17 PROCEDURE — 73560 X-RAY EXAM OF KNEE 1 OR 2: CPT | Mod: 26,LT

## 2023-07-17 DEVICE — MAKO BONE PIN 4MM X 140MM: Type: IMPLANTABLE DEVICE | Site: LEFT | Status: FUNCTIONAL

## 2023-07-17 DEVICE — BASEPLATE TIB TRIATHLON TRITAN SZ 2: Type: IMPLANTABLE DEVICE | Site: LEFT | Status: FUNCTIONAL

## 2023-07-17 DEVICE — CEMENT SIMPLEX P 40GM: Type: IMPLANTABLE DEVICE | Site: LEFT | Status: FUNCTIONAL

## 2023-07-17 DEVICE — FEMORAL CRUCIATE RETAINING: Type: IMPLANTABLE DEVICE | Site: LEFT | Status: FUNCTIONAL

## 2023-07-17 DEVICE — IMP PATELLA SYMMETRIC 31X9MM: Type: IMPLANTABLE DEVICE | Site: LEFT | Status: FUNCTIONAL

## 2023-07-17 DEVICE — INSERT TIB BEARING CS X3 SZ 2 10MM: Type: IMPLANTABLE DEVICE | Site: LEFT | Status: FUNCTIONAL

## 2023-07-17 DEVICE — MAKO BONE PIN 4MM X 110MM: Type: IMPLANTABLE DEVICE | Site: LEFT | Status: FUNCTIONAL

## 2023-07-17 RX ORDER — ACETAMINOPHEN 500 MG
1000 TABLET ORAL ONCE
Refills: 0 | Status: COMPLETED | OUTPATIENT
Start: 2023-07-17 | End: 2023-07-17

## 2023-07-17 RX ORDER — SODIUM CHLORIDE 9 MG/ML
500 INJECTION INTRAMUSCULAR; INTRAVENOUS; SUBCUTANEOUS ONCE
Refills: 0 | Status: COMPLETED | OUTPATIENT
Start: 2023-07-17 | End: 2023-07-17

## 2023-07-17 RX ORDER — TRAMADOL HYDROCHLORIDE 50 MG/1
100 TABLET ORAL EVERY 6 HOURS
Refills: 0 | Status: DISCONTINUED | OUTPATIENT
Start: 2023-07-17 | End: 2023-07-18

## 2023-07-17 RX ORDER — OXYCODONE HYDROCHLORIDE 5 MG/1
5 TABLET ORAL EVERY 4 HOURS
Refills: 0 | Status: DISCONTINUED | OUTPATIENT
Start: 2023-07-17 | End: 2023-07-17

## 2023-07-17 RX ORDER — MAGNESIUM HYDROXIDE 400 MG/1
30 TABLET, CHEWABLE ORAL DAILY
Refills: 0 | Status: DISCONTINUED | OUTPATIENT
Start: 2023-07-17 | End: 2023-07-18

## 2023-07-17 RX ORDER — TRAMADOL HYDROCHLORIDE 50 MG/1
50 TABLET ORAL EVERY 6 HOURS
Refills: 0 | Status: DISCONTINUED | OUTPATIENT
Start: 2023-07-17 | End: 2023-07-17

## 2023-07-17 RX ORDER — TRAMADOL HYDROCHLORIDE 50 MG/1
50 TABLET ORAL ONCE
Refills: 0 | Status: DISCONTINUED | OUTPATIENT
Start: 2023-07-17 | End: 2023-07-17

## 2023-07-17 RX ORDER — PANTOPRAZOLE SODIUM 20 MG/1
40 TABLET, DELAYED RELEASE ORAL ONCE
Refills: 0 | Status: COMPLETED | OUTPATIENT
Start: 2023-07-17 | End: 2023-07-17

## 2023-07-17 RX ORDER — ACETAMINOPHEN 500 MG
1000 TABLET ORAL ONCE
Refills: 0 | Status: COMPLETED | OUTPATIENT
Start: 2023-07-18 | End: 2023-07-18

## 2023-07-17 RX ORDER — CHLORHEXIDINE GLUCONATE 213 G/1000ML
1 SOLUTION TOPICAL ONCE
Refills: 0 | Status: DISCONTINUED | OUTPATIENT
Start: 2023-07-17 | End: 2023-07-17

## 2023-07-17 RX ORDER — ACETAMINOPHEN 500 MG
2 TABLET ORAL
Qty: 0 | Refills: 0 | DISCHARGE
Start: 2023-07-17

## 2023-07-17 RX ORDER — DEXAMETHASONE 0.5 MG/5ML
8 ELIXIR ORAL ONCE
Refills: 0 | Status: COMPLETED | OUTPATIENT
Start: 2023-07-18 | End: 2023-07-18

## 2023-07-17 RX ORDER — POLYETHYLENE GLYCOL 3350 17 G/17G
17 POWDER, FOR SOLUTION ORAL
Qty: 0 | Refills: 0 | DISCHARGE
Start: 2023-07-17

## 2023-07-17 RX ORDER — ONDANSETRON 8 MG/1
4 TABLET, FILM COATED ORAL EVERY 6 HOURS
Refills: 0 | Status: DISCONTINUED | OUTPATIENT
Start: 2023-07-17 | End: 2023-07-18

## 2023-07-17 RX ORDER — LIDOCAINE HCL 20 MG/ML
0.2 VIAL (ML) INJECTION ONCE
Refills: 0 | Status: DISCONTINUED | OUTPATIENT
Start: 2023-07-17 | End: 2023-07-17

## 2023-07-17 RX ORDER — SENNA PLUS 8.6 MG/1
2 TABLET ORAL AT BEDTIME
Refills: 0 | Status: DISCONTINUED | OUTPATIENT
Start: 2023-07-17 | End: 2023-07-18

## 2023-07-17 RX ORDER — PANTOPRAZOLE SODIUM 20 MG/1
40 TABLET, DELAYED RELEASE ORAL
Refills: 0 | Status: DISCONTINUED | OUTPATIENT
Start: 2023-07-17 | End: 2023-07-18

## 2023-07-17 RX ORDER — AMLODIPINE BESYLATE 2.5 MG/1
5 TABLET ORAL DAILY
Refills: 0 | Status: DISCONTINUED | OUTPATIENT
Start: 2023-07-17 | End: 2023-07-18

## 2023-07-17 RX ORDER — SODIUM CHLORIDE 9 MG/ML
3 INJECTION INTRAMUSCULAR; INTRAVENOUS; SUBCUTANEOUS EVERY 8 HOURS
Refills: 0 | Status: DISCONTINUED | OUTPATIENT
Start: 2023-07-17 | End: 2023-07-17

## 2023-07-17 RX ORDER — OXYCODONE HYDROCHLORIDE 5 MG/1
5 TABLET ORAL ONCE
Refills: 0 | Status: DISCONTINUED | OUTPATIENT
Start: 2023-07-17 | End: 2023-07-17

## 2023-07-17 RX ORDER — CEFAZOLIN SODIUM 1 G
2000 VIAL (EA) INJECTION EVERY 8 HOURS
Refills: 0 | Status: COMPLETED | OUTPATIENT
Start: 2023-07-17 | End: 2023-07-17

## 2023-07-17 RX ORDER — SODIUM CHLORIDE 9 MG/ML
500 INJECTION INTRAMUSCULAR; INTRAVENOUS; SUBCUTANEOUS ONCE
Refills: 0 | Status: COMPLETED | OUTPATIENT
Start: 2023-07-17 | End: 2023-07-18

## 2023-07-17 RX ORDER — CEFAZOLIN SODIUM 1 G
2000 VIAL (EA) INJECTION ONCE
Refills: 0 | Status: COMPLETED | OUTPATIENT
Start: 2023-07-17 | End: 2023-07-17

## 2023-07-17 RX ORDER — ASPIRIN/CALCIUM CARB/MAGNESIUM 324 MG
81 TABLET ORAL
Refills: 0 | Status: DISCONTINUED | OUTPATIENT
Start: 2023-07-17 | End: 2023-07-18

## 2023-07-17 RX ORDER — KETOROLAC TROMETHAMINE 30 MG/ML
15 SYRINGE (ML) INJECTION EVERY 6 HOURS
Refills: 0 | Status: DISCONTINUED | OUTPATIENT
Start: 2023-07-17 | End: 2023-07-18

## 2023-07-17 RX ORDER — VALSARTAN 80 MG/1
160 TABLET ORAL DAILY
Refills: 0 | Status: DISCONTINUED | OUTPATIENT
Start: 2023-07-18 | End: 2023-07-18

## 2023-07-17 RX ORDER — TRAMADOL HYDROCHLORIDE 50 MG/1
25 TABLET ORAL EVERY 6 HOURS
Refills: 0 | Status: DISCONTINUED | OUTPATIENT
Start: 2023-07-17 | End: 2023-07-18

## 2023-07-17 RX ORDER — ZOLPIDEM TARTRATE 10 MG/1
5 TABLET ORAL AT BEDTIME
Refills: 0 | Status: DISCONTINUED | OUTPATIENT
Start: 2023-07-17 | End: 2023-07-18

## 2023-07-17 RX ORDER — HYDROMORPHONE HYDROCHLORIDE 2 MG/ML
0.5 INJECTION INTRAMUSCULAR; INTRAVENOUS; SUBCUTANEOUS
Refills: 0 | Status: DISCONTINUED | OUTPATIENT
Start: 2023-07-17 | End: 2023-07-17

## 2023-07-17 RX ORDER — ASPIRIN/CALCIUM CARB/MAGNESIUM 324 MG
1 TABLET ORAL
Qty: 0 | Refills: 0 | DISCHARGE
Start: 2023-07-17

## 2023-07-17 RX ORDER — CELECOXIB 200 MG/1
200 CAPSULE ORAL EVERY 12 HOURS
Refills: 0 | Status: DISCONTINUED | OUTPATIENT
Start: 2023-07-19 | End: 2023-07-18

## 2023-07-17 RX ORDER — ACETAMINOPHEN 500 MG
1000 TABLET ORAL EVERY 8 HOURS
Refills: 0 | Status: DISCONTINUED | OUTPATIENT
Start: 2023-07-18 | End: 2023-07-18

## 2023-07-17 RX ORDER — TRAMADOL HYDROCHLORIDE 50 MG/1
50 TABLET ORAL EVERY 6 HOURS
Refills: 0 | Status: DISCONTINUED | OUTPATIENT
Start: 2023-07-17 | End: 2023-07-18

## 2023-07-17 RX ORDER — POLYETHYLENE GLYCOL 3350 17 G/17G
17 POWDER, FOR SOLUTION ORAL AT BEDTIME
Refills: 0 | Status: DISCONTINUED | OUTPATIENT
Start: 2023-07-17 | End: 2023-07-18

## 2023-07-17 RX ORDER — OXYCODONE HYDROCHLORIDE 5 MG/1
10 TABLET ORAL EVERY 4 HOURS
Refills: 0 | Status: DISCONTINUED | OUTPATIENT
Start: 2023-07-17 | End: 2023-07-17

## 2023-07-17 RX ADMIN — TRAMADOL HYDROCHLORIDE 50 MILLIGRAM(S): 50 TABLET ORAL at 20:01

## 2023-07-17 RX ADMIN — PANTOPRAZOLE SODIUM 40 MILLIGRAM(S): 20 TABLET, DELAYED RELEASE ORAL at 07:49

## 2023-07-17 RX ADMIN — Medication 100 MILLIGRAM(S): at 16:17

## 2023-07-17 RX ADMIN — Medication 15 MILLIGRAM(S): at 21:50

## 2023-07-17 RX ADMIN — Medication 15 MILLIGRAM(S): at 22:20

## 2023-07-17 RX ADMIN — ZOLPIDEM TARTRATE 5 MILLIGRAM(S): 10 TABLET ORAL at 22:13

## 2023-07-17 RX ADMIN — Medication 400 MILLIGRAM(S): at 17:28

## 2023-07-17 RX ADMIN — Medication 1 TABLET(S): at 17:25

## 2023-07-17 RX ADMIN — OXYCODONE HYDROCHLORIDE 5 MILLIGRAM(S): 5 TABLET ORAL at 14:24

## 2023-07-17 RX ADMIN — SODIUM CHLORIDE 500 MILLILITER(S): 9 INJECTION INTRAMUSCULAR; INTRAVENOUS; SUBCUTANEOUS at 16:16

## 2023-07-17 RX ADMIN — Medication 15 MILLIGRAM(S): at 16:17

## 2023-07-17 RX ADMIN — OXYCODONE HYDROCHLORIDE 5 MILLIGRAM(S): 5 TABLET ORAL at 15:00

## 2023-07-17 RX ADMIN — SODIUM CHLORIDE 500 MILLILITER(S): 9 INJECTION INTRAMUSCULAR; INTRAVENOUS; SUBCUTANEOUS at 10:45

## 2023-07-17 RX ADMIN — Medication 81 MILLIGRAM(S): at 17:25

## 2023-07-17 RX ADMIN — TRAMADOL HYDROCHLORIDE 50 MILLIGRAM(S): 50 TABLET ORAL at 20:31

## 2023-07-17 RX ADMIN — Medication 100 MILLIGRAM(S): at 23:51

## 2023-07-17 RX ADMIN — Medication 15 MILLIGRAM(S): at 16:30

## 2023-07-17 RX ADMIN — OXYCODONE HYDROCHLORIDE 5 MILLIGRAM(S): 5 TABLET ORAL at 16:00

## 2023-07-17 RX ADMIN — OXYCODONE HYDROCHLORIDE 5 MILLIGRAM(S): 5 TABLET ORAL at 14:15

## 2023-07-17 RX ADMIN — TRAMADOL HYDROCHLORIDE 50 MILLIGRAM(S): 50 TABLET ORAL at 07:50

## 2023-07-17 RX ADMIN — ONDANSETRON 4 MILLIGRAM(S): 8 TABLET, FILM COATED ORAL at 19:55

## 2023-07-17 NOTE — DISCHARGE NOTE PROVIDER - NSDCFUSCHEDAPPT_GEN_ALL_CORE_FT
[facility]  ORTHOSURG [address]  Scheduled Appointment: 08/01/2023    Yung Bae  [facility]  PULMMED [address]  Scheduled Appointment: 08/22/2023

## 2023-07-17 NOTE — PHYSICAL THERAPY INITIAL EVALUATION ADULT - PLANNED THERAPY INTERVENTIONS, PT EVAL
GOAL: Patient will be able to negotiate 2 steps in 2 weeks/bed mobility training/gait training/ROM/strengthening/transfer training

## 2023-07-17 NOTE — PHYSICAL THERAPY INITIAL EVALUATION ADULT - PERTINENT HX OF CURRENT PROBLEM, REHAB EVAL
67 y/o F with PMHx of HTN, Osteoarthritis, Covid(05/4/2023, mild symptoms, took paxlovid) presents with complaints of experiencing gradually worsening left knee pain over the past few months. Patient reports she has attempted the use of NSAID's and steroid joint injections which only temporarily alleviate her symptoms. Hospital course: Pt. is s/p LT TKA 7/17

## 2023-07-17 NOTE — PATIENT PROFILE ADULT - FALL HARM RISK - UNIVERSAL INTERVENTIONS
Bed in lowest position, wheels locked, appropriate side rails in place/Call bell, personal items and telephone in reach/Instruct patient to call for assistance before getting out of bed or chair/Non-slip footwear when patient is out of bed/[place] to call system/Physically safe environment - no spills, clutter or unnecessary equipment/Purposeful Proactive Rounding/Room/bathroom lighting operational, light cord in reach

## 2023-07-17 NOTE — CHART NOTE - NSCHARTNOTEFT_GEN_A_CORE
POC    POC    Seen in PACU  Already ambulated w: PT  Doing well    No Chest Pain, SOB, N/V.    T(C): 36.4 (07-17-23 @ 10:35), Max: 36.4 (07-17-23 @ 07:01)  HR: 79 (07-17-23 @ 13:00) (66 - 88)  BP: 99/59 (07-17-23 @ 13:00) (88/55 - 126/77)  RR: 17 (07-17-23 @ 13:00) (15 - 18)  SpO2: 95% (07-17-23 @ 13:00) (95% - 100%)  Wt(kg): --    Exam:  Alert and [place], No Acute Distress  Pulm: CTAB  Abdomen soft / benign  [place]  [n ]   EXT   LLE      Aquacel dressing C/D [x ]        Calves soft       (+) DF  PF;  EHL / FHL 5/5        No Sensory Deficits noted        2+ pulses    Xray:---- prosthesis in good alignment       A/P: S/p Total knee arthroplasty    -PT/OT-WBAT-  -Chk AM Labs  -DVT PPx: ASA 81 mg BID  -Pain Control PO/IV Pain Rx  -Continue Current Tx  -Dispo planning: anticipate home pending PT recs      ***See Above  Clint [facility]  Orthopedics  B: [id number]

## 2023-07-17 NOTE — DISCHARGE NOTE PROVIDER - NSDCFUADDINST_GEN_ALL_CORE_FT
1. Keep Aquacel dressing clean and dry   2. ice to affected incision every 4-6 hours x 72 hours  3. elevate affected extremity when @ rest   4. Range of motion exercises encouraged   5. Continue ECOTRIN 81 mg by mouth 2x / day (at breakfast and at dinner) for a total of 4 WEEKS   6. May shower; protect Aquacel dressing with water-tight seal  i.e. saran wrap; pat dry   7. Follow up with Dr Cazares in 2 WEEKS for dressing REMOVAL, wound check, and staple/suture removal (if applicable)   Please call for an appointment  Please follow up with Dr. Cazares at your pre-scheduled post-operative follow up appointment. (Call to confirm)  Dressing care:   -Keep dressing clean, dry, and intact. Do not remove bandage until date written on bandage.   Continue to ambulate as tolerated to the operative leg.     Pain medications:   Standing:         -Acetaminophen 500mg - 2 tabs every 8 hours        -Naproxen 500mg - 1 tab every 12 hours  As needed:        -Tramadol 50mg - 1 tab every 6 hours - Take only if needed for MODERATE pain       -oxycodone 5mg - 1 tab every 4-6 hours - Take only if needed for SEVERE or BREAKTHROUGH pain    Other Medications: (Standing)  -Aspirin (Enteric Coated) 81mg every 12 hours - to prevent blood clots (for 4 weeks post operatively.)  -Protonix 40mg - 1 tab every 24 hours - to prevent stomach irritation/ulcers  -Senna 8.6mg - 2 pills every 24 hours - stool softener  -colace 100mg - 1 pill every 8 hours - stool softener.     Recommended to follow up with your primary care provider within 1-2 months of hospital discharge to discuss your recent surgery and any change to your medications.

## 2023-07-17 NOTE — DISCHARGE NOTE PROVIDER - CARE PROVIDER_API CALL
Hira Cazares  Orthopaedic Surgery  [address], [address]  [place] [address]  Phone: [phone]  Fax: [phone]  Follow Up Time:    Hira Cazares  Orthopaedic Surgery  [address], [address]  [place] [address]  Phone: [phone]  Fax: [phone]  Scheduled Appointment: 08/01/2023

## 2023-07-17 NOTE — DISCHARGE NOTE PROVIDER - HOSPITAL COURSE
Reason for Admission  "Left Knee Replacement"  GOC:  I want to walk without pain    History of Present Illness    67 y/o F with PMHx of HTN, Osteoarthritis, Covid(05/4/2023, mild symptoms, took paxlovid) presents with complaints of experiencing gradually worsening left knee pain over the past few months. Patient reports she has attempted the use of NSAID's and steroid joint injections which only temporarily alleviate her symptoms. Patient is scheduled Left Total Knee Arthroplasty Robotic Assisted With Conrad with Dr. Cazares on 07/17/2023.         Allergies:  	No Known Allergies:     Past Medical, Past Surgical, and Family History:  PAST MEDICAL HISTORY:  COVID-19   Cyst, ovarian   HTN (hypertension)     PAST SURGICAL HISTORY:  Ovarian cyst rupture   S/P total hysterectomy.    Hospital Course:   7/17:  Patient admitted and underwent L TKA w/ Dr Cazares. No complications. Eval by PT: suggest home with home PT  7/18:  [id number]          History of Present Illness  69 y/o F with PMHx of HTN, Osteoarthritis, Covid(05/4/2023, mild symptoms, took paxlovid) presents with complaints of experiencing gradually worsening left knee pain over the past few months. Patient reports she has attempted the use of NSAID's and steroid joint injections which only temporarily alleviate her symptoms. Patient is scheduled Left Total Knee Arthroplasty Robotic Assisted With Conrad with Dr. Cazares on 07/17/2023.    GOC:  I want to walk without pain    PAST MEDICAL HISTORY:  COVID-19   Cyst, ovarian   HTN (hypertension)     PAST SURGICAL HISTORY:  Ovarian cyst rupture   S/P total hysterectomy.      Hospital Course:   After admission on 7/17/23 and receiving pre-operative parenteral prophylactic antibiotics, the patient underwent an uncomplicated Left total knee replacement with CONRAD robotic assist with Dr. Cazares. Patient tolerated the procedure well and was transferred to the recovery room in stable condition, with a stable neuro / vascular exam of the operated extremity.    Patient was placed on ecotrin 81mg BID x 4 weeks for DVT ppx, and was placed on Protonix for GI protection.   Patient was made weight bearing as tolerated with the operative leg.     Patient evaluated by PT/OT and recommended for disposition for home with home PT.     Discharge and Orthopedic Care instructions were delineated in the Discharge Plan and reviewed with the patient. All medications were delineated in the medication reconciliation tool and key points were reviewed with the patient. They were deemed stable from an Orthopedic & medical standpoint for discharge.

## 2023-07-17 NOTE — PHYSICAL THERAPY INITIAL EVALUATION ADULT - ADDITIONAL COMMENTS
Patient lives in a single floor house with 2 steps w/o hand rails to enter; first floor set up. Patient reports PTA, she was independent in ADLs w/o use of AD. Patient reports she has her  and sister at home to attend ADLs, as required.

## 2023-07-17 NOTE — PATIENT PROFILE ADULT - OVER THE PAST TWO WEEKS, HAVE YOU FELT LITTLE INTEREST OR PLEASURE IN DOING THINGS?
I called and s/w her.  All of her studies are stable c/w prior. No changes, she has 1 year f/u with me.
no

## 2023-07-17 NOTE — DISCHARGE NOTE PROVIDER - NSDCACTIVITY_GEN_ALL_CORE
Do not drive or operate machinery/Showering allowed/Stairs allowed/Walking - Indoors allowed/No heavy lifting/straining/Walking - Outdoors allowed Do not drive or operate machinery/Showering allowed/Stairs allowed/Walking - Indoors allowed/No heavy lifting/straining/Walking - Outdoors allowed/Follow Instructions Provided by your Surgical Team

## 2023-07-17 NOTE — PRE-ANESTHESIA EVALUATION ADULT - NSANTHPMHFT_GEN_ALL_CORE
chart and consultant notes reviewed. no hx sig cv/pulm dz - states et > 1 flight, no cp/sob. 3/23 tte essentially unremarkable

## 2023-07-17 NOTE — DISCHARGE NOTE PROVIDER - NSDCCPCAREPLAN_GEN_ALL_CORE_FT
PRINCIPAL DISCHARGE DIAGNOSIS  Diagnosis: Unilateral primary osteoarthritis, left knee  Assessment and Plan of Treatment:

## 2023-07-17 NOTE — PHYSICAL THERAPY INITIAL EVALUATION ADULT - GAIT DEVIATIONS NOTED, PT EVAL
decreased juliana/decreased velocity of limb motion/decreased step length/decreased weight-shifting ability

## 2023-07-17 NOTE — DISCHARGE NOTE PROVIDER - NSDCMRMEDTOKEN_GEN_ALL_CORE_FT
acetaminophen 500 mg oral tablet: 2 tab(s) orally every 8 hours x ONE (1) WEEK, then as needed  amlodipine-valsartan 5 mg-160 mg oral tablet: 1 tab(s) orally once a day  aspirin 81 mg oral delayed release tablet: 1 tab(s) orally 2 times a day x FOUR (4) WEEKS Total (blood thinner) then STOP  Multiple Vitamins oral tablet: 1 tab(s) orally once a day  polyethylene glycol 3350 oral powder for reconstitution: 17 gram(s) orally once a day (at bedtime) while on pain medications   acetaminophen 500 mg oral tablet: 2 tab(s) orally every 8 hours x ONE (1) WEEK, then as needed  amlodipine-valsartan 5 mg-160 mg oral tablet: 1 tab(s) orally once a day  aspirin 81 mg oral delayed release tablet: 1 tab(s) orally 2 times a day x FOUR (4) WEEKS Total (blood thinner) then STOP  Multiple Vitamins oral tablet: 1 tab(s) orally once a day  pantoprazole 40 mg oral delayed release tablet: 1 tab(s) orally once a day (before a meal)  polyethylene glycol 3350 oral powder for reconstitution: 17 gram(s) orally once a day (at bedtime) while on pain medications   acetaminophen 500 mg oral tablet: 2 tab(s) orally every 8 hours x ONE (1) WEEK, then as needed  amlodipine-valsartan 5 mg-160 mg oral tablet: 1 tab(s) orally once a day  Colace 100 mg oral capsule: 1 cap(s) orally 2 times a day as needed for  constipation  Ecotrin Adult Low Strength 81 mg oral delayed release tablet: 1 tab(s) orally 2 times a day x 4 weeks post op for DVT ppx  Multiple Vitamins oral tablet: 1 tab(s) orally once a day  naproxen 500 mg oral tablet: 1 tab(s) orally 2 times a day  Narcan 4 mg/0.1 mL nasal spray: 4 milligram(s) intranasally every 2 to 3 minutes alternating nostrils as needed for overdose  oxyCODONE 5 mg oral tablet: 1 tab(s) orally every 4 hours as needed for  severe pain MDD: 006  pantoprazole 40 mg oral delayed release tablet: 1 tab(s) orally once a day (before a meal)  senna leaf extract oral tablet: 2 tab(s) orally once a day (at bedtime) as needed for  constipation  traMADol 50 mg oral tablet: 1 tab(s) orally every 6 hours as needed for Moderate Pain (4 - 6) MDD: 004

## 2023-07-18 ENCOUNTER — TRANSCRIPTION ENCOUNTER (OUTPATIENT)
Age: 68
End: 2023-07-18

## 2023-07-18 VITALS
OXYGEN SATURATION: 96 % | HEART RATE: 65 BPM | SYSTOLIC BLOOD PRESSURE: 115 MMHG | TEMPERATURE: 98 F | RESPIRATION RATE: 18 BRPM | DIASTOLIC BLOOD PRESSURE: 75 MMHG

## 2023-07-18 LAB
ANION GAP SERPL CALC-SCNC: 13 MMOL/L — SIGNIFICANT CHANGE UP (ref 5–17)
BUN SERPL-MCNC: 12 MG/DL — SIGNIFICANT CHANGE UP (ref 7–23)
CALCIUM SERPL-MCNC: 8.6 MG/DL — SIGNIFICANT CHANGE UP (ref 8.4–10.5)
CHLORIDE SERPL-SCNC: 105 MMOL/L — SIGNIFICANT CHANGE UP (ref 96–108)
CO2 SERPL-SCNC: 21 MMOL/L — LOW (ref 22–31)
CREAT SERPL-MCNC: 0.44 MG/DL — LOW (ref 0.5–1.3)
EGFR: 105 ML/MIN/1.73M2 — SIGNIFICANT CHANGE UP
GLUCOSE SERPL-MCNC: 96 MG/DL — SIGNIFICANT CHANGE UP (ref 70–99)
HCT VFR BLD CALC: 36.4 % — SIGNIFICANT CHANGE UP (ref 34.5–45)
HGB BLD-MCNC: 12.1 G/DL — SIGNIFICANT CHANGE UP (ref 11.5–15.5)
MCHC RBC-ENTMCNC: 29 PG — SIGNIFICANT CHANGE UP (ref 27–34)
MCHC RBC-ENTMCNC: 33.2 GM/DL — SIGNIFICANT CHANGE UP (ref 32–36)
MCV RBC AUTO: 87.3 FL — SIGNIFICANT CHANGE UP (ref 80–100)
NRBC # BLD: 0 /100 WBCS — SIGNIFICANT CHANGE UP (ref 0–0)
PLATELET # BLD AUTO: 246 K/UL — SIGNIFICANT CHANGE UP (ref 150–400)
POTASSIUM SERPL-MCNC: 3.9 MMOL/L — SIGNIFICANT CHANGE UP (ref 3.5–5.3)
POTASSIUM SERPL-SCNC: 3.9 MMOL/L — SIGNIFICANT CHANGE UP (ref 3.5–5.3)
RBC # BLD: 4.17 M/UL — SIGNIFICANT CHANGE UP (ref 3.8–5.2)
RBC # FLD: 13.2 % — SIGNIFICANT CHANGE UP (ref 10.3–14.5)
SODIUM SERPL-SCNC: 139 MMOL/L — SIGNIFICANT CHANGE UP (ref 135–145)
WBC # BLD: 12 K/UL — HIGH (ref 3.8–10.5)
WBC # FLD AUTO: 12 K/UL — HIGH (ref 3.8–10.5)

## 2023-07-18 PROCEDURE — C1713: CPT

## 2023-07-18 PROCEDURE — 97161 PT EVAL LOW COMPLEX 20 MIN: CPT

## 2023-07-18 PROCEDURE — 73560 X-RAY EXAM OF KNEE 1 OR 2: CPT

## 2023-07-18 PROCEDURE — 82962 GLUCOSE BLOOD TEST: CPT

## 2023-07-18 PROCEDURE — S2900: CPT

## 2023-07-18 PROCEDURE — 80048 BASIC METABOLIC PNL TOTAL CA: CPT

## 2023-07-18 PROCEDURE — 97165 OT EVAL LOW COMPLEX 30 MIN: CPT

## 2023-07-18 PROCEDURE — C1776: CPT

## 2023-07-18 PROCEDURE — 85027 COMPLETE CBC AUTOMATED: CPT

## 2023-07-18 RX ORDER — PANTOPRAZOLE SODIUM 20 MG/1
1 TABLET, DELAYED RELEASE ORAL
Qty: 0 | Refills: 0 | DISCHARGE
Start: 2023-07-18

## 2023-07-18 RX ORDER — OXYCODONE HYDROCHLORIDE 5 MG/1
1 TABLET ORAL
Qty: 42 | Refills: 0
Start: 2023-07-18 | End: 2023-07-24

## 2023-07-18 RX ORDER — TRAMADOL HYDROCHLORIDE 50 MG/1
1 TABLET ORAL
Qty: 28 | Refills: 0
Start: 2023-07-18 | End: 2023-07-24

## 2023-07-18 RX ORDER — SENNA PLUS 8.6 MG/1
2 TABLET ORAL
Qty: 14 | Refills: 0
Start: 2023-07-18 | End: 2023-07-24

## 2023-07-18 RX ORDER — DOCUSATE SODIUM 100 MG
1 CAPSULE ORAL
Qty: 14 | Refills: 0
Start: 2023-07-18 | End: 2023-07-24

## 2023-07-18 RX ORDER — PANTOPRAZOLE SODIUM 20 MG/1
1 TABLET, DELAYED RELEASE ORAL
Qty: 30 | Refills: 0
Start: 2023-07-18 | End: 2023-08-16

## 2023-07-18 RX ORDER — NALOXONE HYDROCHLORIDE 4 MG/.1ML
4 SPRAY NASAL
Qty: 1 | Refills: 0
Start: 2023-07-18

## 2023-07-18 RX ORDER — ACETAMINOPHEN 500 MG
2 TABLET ORAL
Qty: 84 | Refills: 0
Start: 2023-07-18 | End: 2023-07-31

## 2023-07-18 RX ORDER — ASPIRIN/CALCIUM CARB/MAGNESIUM 324 MG
1 TABLET ORAL
Qty: 60 | Refills: 0
Start: 2023-07-18 | End: 2023-08-16

## 2023-07-18 RX ORDER — ACETAMINOPHEN 500 MG
1000 TABLET ORAL ONCE
Refills: 0 | Status: DISCONTINUED | OUTPATIENT
Start: 2023-07-18 | End: 2023-07-18

## 2023-07-18 RX ADMIN — Medication 81 MILLIGRAM(S): at 05:51

## 2023-07-18 RX ADMIN — AMLODIPINE BESYLATE 5 MILLIGRAM(S): 2.5 TABLET ORAL at 05:51

## 2023-07-18 RX ADMIN — Medication 1 TABLET(S): at 14:38

## 2023-07-18 RX ADMIN — TRAMADOL HYDROCHLORIDE 50 MILLIGRAM(S): 50 TABLET ORAL at 11:00

## 2023-07-18 RX ADMIN — Medication 15 MILLIGRAM(S): at 04:18

## 2023-07-18 RX ADMIN — VALSARTAN 160 MILLIGRAM(S): 80 TABLET ORAL at 05:51

## 2023-07-18 RX ADMIN — Medication 400 MILLIGRAM(S): at 02:23

## 2023-07-18 RX ADMIN — TRAMADOL HYDROCHLORIDE 50 MILLIGRAM(S): 50 TABLET ORAL at 02:23

## 2023-07-18 RX ADMIN — Medication 15 MILLIGRAM(S): at 04:48

## 2023-07-18 RX ADMIN — TRAMADOL HYDROCHLORIDE 50 MILLIGRAM(S): 50 TABLET ORAL at 02:53

## 2023-07-18 RX ADMIN — Medication 1000 MILLIGRAM(S): at 02:53

## 2023-07-18 RX ADMIN — SODIUM CHLORIDE 500 MILLILITER(S): 9 INJECTION INTRAMUSCULAR; INTRAVENOUS; SUBCUTANEOUS at 05:50

## 2023-07-18 RX ADMIN — Medication 101.6 MILLIGRAM(S): at 05:50

## 2023-07-18 RX ADMIN — PANTOPRAZOLE SODIUM 40 MILLIGRAM(S): 20 TABLET, DELAYED RELEASE ORAL at 05:51

## 2023-07-18 RX ADMIN — TRAMADOL HYDROCHLORIDE 50 MILLIGRAM(S): 50 TABLET ORAL at 10:12

## 2023-07-18 NOTE — OCCUPATIONAL THERAPY INITIAL EVALUATION ADULT - DIAGNOSIS, OT EVAL
pt demonstrated decreased strength, balance, ROM impacting pt's ability to participate in functional mobility and ADLs.

## 2023-07-18 NOTE — PROGRESS NOTE ADULT - ASSESSMENT
ASSESSMENT/PLAN:   SINGH SYLVESTER is a 68yFemale s/p L TKA, now POD1    - Pain control  - WBAT LLE  - PT/OT/OOB  - DVT ppx: ASA 81 BID  - Dispo: Home, PT cleared

## 2023-07-18 NOTE — OCCUPATIONAL THERAPY INITIAL EVALUATION ADULT - PERTINENT HX OF CURRENT PROBLEM, REHAB EVAL
67 y/o F with PMHx of HTN, Osteoarthritis, Covid(05/4/2023, mild symptoms, took paxlovid) presents with complaints of experiencing gradually worsening left knee pain over the past few months. Patient reports she has attempted the use of NSAID's and steroid joint injections which only temporarily alleviate her symptoms. Patient is scheduled Left Total Knee Arthroplasty Robotic Assisted With Conrad with Dr. Cazares on 07/17/2023.

## 2023-07-18 NOTE — PROGRESS NOTE ADULT - SUBJECTIVE AND OBJECTIVE BOX
Ortho Progress Note    S: Patient seen and examined. No acute events overnight. Pain well controlled with current regimen. Denies lightheadedness/dizziness, CP/SOB. Tolerating diet.       O:  Physical Exam:  Gen: Laying in bed, NAD, alert and oriented.   Resp: Unlabored breathing  Ext: LLE- dressing c/d/i          EHL/FHL/TA/Sol intact          + SILT DP/SP/ROJAS/Sa/T          +DP, extremity WWP    Vital Signs Last 24 Hrs  T(C): 36.4 (18 Jul 2023 05:10), Max: 36.4 (17 Jul 2023 07:01)  T(F): 97.5 (18 Jul 2023 05:10), Max: 97.6 (17 Jul 2023 20:36)  HR: 77 (18 Jul 2023 05:10) (66 - 90)  BP: 113/75 (18 Jul 2023 05:10) (88/55 - 133/72)  BP(mean): 96 (17 Jul 2023 18:00) (66 - 96)  RR: 18 (18 Jul 2023 05:10) (15 - 18)  SpO2: 97% (18 Jul 2023 05:10) (93% - 100%)    Parameters below as of 18 Jul 2023 05:10  Patient On (Oxygen Delivery Method): room air

## 2023-07-18 NOTE — DISCHARGE NOTE NURSING/CASE MANAGEMENT/SOCIAL WORK - PATIENT PORTAL LINK FT
You can access the FollowMyHealth Patient Portal offered by [facility] by registering at the following website: [email or web address]. By joining [facility]’s FollowMyHealth portal, you will also be able to view your health information using other applications (apps) compatible with our system.

## 2023-07-18 NOTE — OCCUPATIONAL THERAPY INITIAL EVALUATION ADULT - VISUAL ASSESSMENT: EYE MUSCLE BALANCE
2239 pt appears to have converted to NSR HR 80 Repeat EKG ordered   Pt rpt not feeling much improvement from hour long breathing treatment. ER Phys immediately made aware, new orders placed to assist PT w/ breathing.  PT cont to have stridor breath sounds normal

## 2023-07-24 ENCOUNTER — NON-APPOINTMENT (OUTPATIENT)
Age: 68
End: 2023-07-24

## 2023-08-01 ENCOUNTER — APPOINTMENT (OUTPATIENT)
Dept: ORTHOPEDIC SURGERY | Facility: CLINIC | Age: 68
End: 2023-08-01
Payer: COMMERCIAL

## 2023-08-01 VITALS — HEIGHT: 57 IN | WEIGHT: 120 LBS | BODY MASS INDEX: 25.89 KG/M2

## 2023-08-01 PROCEDURE — 99024 POSTOP FOLLOW-UP VISIT: CPT

## 2023-08-01 PROCEDURE — 73564 X-RAY EXAM KNEE 4 OR MORE: CPT | Mod: 26,LT

## 2023-08-01 NOTE — DISCUSSION/SUMMARY
[de-identified] : The patient is doing well after joint replacement surgery. Written infectious precautions were reviewed. The patient will progress with physical therapy at this time and they will work on transitioning from requiring assistive devices for ambulation. Anti-coagulant therapy will be discontinued at 1 month post surgery for the purpose of orthopedic thromboembolism prophylaxis. Return around the 6 week anniversary from surgery for follow-up evaluation.

## 2023-08-01 NOTE — PHYSICAL EXAM
[de-identified] : Well developed, well nourished in no apparent distress, awake, alert and orientated to person, place and time with appropriate mood and affect Respirations are even and unlabored. Gait evaluation does not reveal a limp. There is no inguinal adenopathy. The affected limb is well-perfused with palpable pedal pulse, without skin lesions, shows a grossly normal motor and sensory examination. Incision is CDI. Knee motion is 0-85 [de-identified] : AP, lateral, sunrise knee and tunnel  x-rays of the left knee were ordered and obtained in the office and demonstrate satisfactory position and alignment of the components are present. No signs of loosening are seen.

## 2023-08-01 NOTE — HISTORY OF PRESENT ILLNESS
[de-identified] : Status-post left total knee  arthroplasty here for initial postoperative evaluation. Excellent progress is noted in terms of pain and restoration of function. Pain is well controlled with oral medications. There has been no change in medical health since discharge. The patient does require assistive devices.

## 2023-08-07 RX ORDER — OXYCODONE 5 MG/1
5 TABLET ORAL
Qty: 28 | Refills: 0 | Status: ACTIVE | COMMUNITY
Start: 2023-08-07 | End: 1900-01-01

## 2023-08-08 ENCOUNTER — RX RENEWAL (OUTPATIENT)
Age: 68
End: 2023-08-08

## 2023-08-14 RX ORDER — TRAMADOL HYDROCHLORIDE 50 MG/1
50 TABLET, COATED ORAL
Qty: 28 | Refills: 0 | Status: ACTIVE | COMMUNITY
Start: 2023-08-14 | End: 1900-01-01

## 2023-08-16 ENCOUNTER — TRANSCRIPTION ENCOUNTER (OUTPATIENT)
Age: 68
End: 2023-08-16

## 2023-08-22 ENCOUNTER — APPOINTMENT (OUTPATIENT)
Dept: PULMONOLOGY | Facility: CLINIC | Age: 68
End: 2023-08-22

## 2023-08-29 ENCOUNTER — NON-APPOINTMENT (OUTPATIENT)
Age: 68
End: 2023-08-29

## 2023-08-29 ENCOUNTER — APPOINTMENT (OUTPATIENT)
Dept: ORTHOPEDIC SURGERY | Facility: CLINIC | Age: 68
End: 2023-08-29
Payer: COMMERCIAL

## 2023-08-29 VITALS — WEIGHT: 120 LBS | HEIGHT: 57 IN | BODY MASS INDEX: 25.89 KG/M2

## 2023-08-29 PROCEDURE — 99024 POSTOP FOLLOW-UP VISIT: CPT

## 2023-08-29 RX ORDER — TRAMADOL HYDROCHLORIDE 50 MG/1
50 TABLET, COATED ORAL
Qty: 28 | Refills: 0 | Status: ACTIVE | COMMUNITY
Start: 2023-08-29 | End: 1900-01-01

## 2023-08-29 RX ORDER — CELECOXIB 100 MG/1
100 CAPSULE ORAL TWICE DAILY
Qty: 60 | Refills: 2 | Status: ACTIVE | COMMUNITY
Start: 2023-08-29 | End: 1900-01-01

## 2023-08-29 NOTE — HISTORY OF PRESENT ILLNESS
[de-identified] : Status-post left total knee  arthroplasty here for routine postoperative evaluation. Excellent progress is noted in terms of pain and restoration of function. Pain is well controlled with oral medications. There has been no change in medical health since discharge. The patient does not require assistive devices.

## 2023-08-29 NOTE — PHYSICAL EXAM
[de-identified] : Well developed, well nourished in no apparent distress, awake, alert and orientated to person, place and time with appropriate mood and affect Respirations are even and unlabored. Gait evaluation does not reveal a limp. There is no inguinal adenopathy. The affected limb is well-perfused with palpable pedal pulse, without skin lesions, shows a grossly normal motor and sensory examination. Incision is CDI. Knee motion is 0-110

## 2023-08-29 NOTE — DISCUSSION/SUMMARY
[de-identified] : The patient is doing well after joint replacement surgery. WBAT. Return around the 6 month  anniversary from surgery for follow-up evaluation.

## 2023-09-19 RX ORDER — OXYCODONE 5 MG/1
5 TABLET ORAL
Qty: 28 | Refills: 0 | Status: ACTIVE | COMMUNITY
Start: 2023-07-27 | End: 1900-01-01

## 2023-10-12 ENCOUNTER — RX RENEWAL (OUTPATIENT)
Age: 68
End: 2023-10-12

## 2023-10-26 ENCOUNTER — APPOINTMENT (OUTPATIENT)
Dept: ORTHOPEDIC SURGERY | Facility: CLINIC | Age: 68
End: 2023-10-26
Payer: COMMERCIAL

## 2023-10-26 VITALS — BODY MASS INDEX: 25.67 KG/M2 | WEIGHT: 119 LBS | HEIGHT: 57 IN

## 2023-10-26 DIAGNOSIS — M17.0 BILATERAL PRIMARY OSTEOARTHRITIS OF KNEE: ICD-10-CM

## 2023-10-26 DIAGNOSIS — M17.9 OSTEOARTHRITIS OF KNEE, UNSPECIFIED: ICD-10-CM

## 2023-10-26 DIAGNOSIS — M23.91 UNSPECIFIED INTERNAL DERANGEMENT OF RIGHT KNEE: ICD-10-CM

## 2023-10-26 PROCEDURE — 72100 X-RAY EXAM L-S SPINE 2/3 VWS: CPT

## 2023-10-26 PROCEDURE — 99214 OFFICE O/P EST MOD 30 MIN: CPT

## 2023-10-26 PROCEDURE — 73564 X-RAY EXAM KNEE 4 OR MORE: CPT | Mod: LT

## 2023-10-26 RX ORDER — MELOXICAM 15 MG/1
15 TABLET ORAL
Qty: 30 | Refills: 2 | Status: ACTIVE | COMMUNITY
Start: 2023-10-26 | End: 1900-01-01

## 2023-11-04 ENCOUNTER — OUTPATIENT (OUTPATIENT)
Dept: OUTPATIENT SERVICES | Facility: HOSPITAL | Age: 68
LOS: 1 days | End: 2023-11-04
Payer: COMMERCIAL

## 2023-11-04 ENCOUNTER — APPOINTMENT (OUTPATIENT)
Dept: MRI IMAGING | Facility: CLINIC | Age: 68
End: 2023-11-04
Payer: COMMERCIAL

## 2023-11-04 DIAGNOSIS — N83.209 UNSPECIFIED OVARIAN CYST, UNSPECIFIED SIDE: Chronic | ICD-10-CM

## 2023-11-04 DIAGNOSIS — Z00.8 ENCOUNTER FOR OTHER GENERAL EXAMINATION: ICD-10-CM

## 2023-11-04 DIAGNOSIS — Z90.710 ACQUIRED ABSENCE OF BOTH CERVIX AND UTERUS: Chronic | ICD-10-CM

## 2023-11-04 DIAGNOSIS — M54.16 RADICULOPATHY, LUMBAR REGION: ICD-10-CM

## 2023-11-04 PROCEDURE — 72148 MRI LUMBAR SPINE W/O DYE: CPT

## 2023-11-04 PROCEDURE — 72148 MRI LUMBAR SPINE W/O DYE: CPT | Mod: 26

## 2023-11-07 ENCOUNTER — NON-APPOINTMENT (OUTPATIENT)
Age: 68
End: 2023-11-07

## 2023-11-13 ENCOUNTER — APPOINTMENT (OUTPATIENT)
Dept: ORTHOPEDIC SURGERY | Facility: CLINIC | Age: 68
End: 2023-11-13
Payer: COMMERCIAL

## 2023-11-13 VITALS
HEIGHT: 59 IN | HEART RATE: 92 BPM | SYSTOLIC BLOOD PRESSURE: 147 MMHG | DIASTOLIC BLOOD PRESSURE: 89 MMHG | OXYGEN SATURATION: 98 % | BODY MASS INDEX: 23.99 KG/M2 | WEIGHT: 119 LBS

## 2023-11-13 DIAGNOSIS — M48.07 SPINAL STENOSIS, LUMBOSACRAL REGION: ICD-10-CM

## 2023-11-13 PROCEDURE — 99214 OFFICE O/P EST MOD 30 MIN: CPT

## 2023-11-13 RX ORDER — METHYLPREDNISOLONE 4 MG/1
4 TABLET ORAL
Qty: 1 | Refills: 1 | Status: ACTIVE | COMMUNITY
Start: 2023-11-13 | End: 1900-01-01

## 2023-11-16 ENCOUNTER — APPOINTMENT (OUTPATIENT)
Dept: ORTHOPEDIC SURGERY | Facility: CLINIC | Age: 68
End: 2023-11-16
Payer: COMMERCIAL

## 2023-11-16 ENCOUNTER — NON-APPOINTMENT (OUTPATIENT)
Age: 68
End: 2023-11-16

## 2023-11-16 PROCEDURE — 99442: CPT

## 2023-11-25 ENCOUNTER — NON-APPOINTMENT (OUTPATIENT)
Age: 68
End: 2023-11-25

## 2023-11-27 ENCOUNTER — APPOINTMENT (OUTPATIENT)
Dept: ORTHOPEDIC SURGERY | Facility: CLINIC | Age: 68
End: 2023-11-27

## 2023-11-29 ENCOUNTER — NON-APPOINTMENT (OUTPATIENT)
Age: 68
End: 2023-11-29

## 2023-12-10 ENCOUNTER — NON-APPOINTMENT (OUTPATIENT)
Age: 68
End: 2023-12-10

## 2024-01-03 ENCOUNTER — APPOINTMENT (OUTPATIENT)
Dept: OBGYN | Facility: CLINIC | Age: 69
End: 2024-01-03
Payer: COMMERCIAL

## 2024-01-03 VITALS
WEIGHT: 119 LBS | DIASTOLIC BLOOD PRESSURE: 89 MMHG | SYSTOLIC BLOOD PRESSURE: 138 MMHG | HEIGHT: 59 IN | BODY MASS INDEX: 23.99 KG/M2

## 2024-01-03 DIAGNOSIS — N90.5 ATROPHY OF VULVA: ICD-10-CM

## 2024-01-03 DIAGNOSIS — Z90.722 ACQUIRED ABSENCE OF BOTH CERVIX AND UTERUS: ICD-10-CM

## 2024-01-03 DIAGNOSIS — Z01.411 ENCOUNTER FOR GYNECOLOGICAL EXAMINATION (GENERAL) (ROUTINE) WITH ABNORMAL FINDINGS: ICD-10-CM

## 2024-01-03 DIAGNOSIS — Z90.79 ACQUIRED ABSENCE OF BOTH CERVIX AND UTERUS: ICD-10-CM

## 2024-01-03 DIAGNOSIS — Z90.710 ACQUIRED ABSENCE OF BOTH CERVIX AND UTERUS: ICD-10-CM

## 2024-01-03 PROCEDURE — 99397 PER PM REEVAL EST PAT 65+ YR: CPT

## 2024-01-03 PROCEDURE — 82270 OCCULT BLOOD FECES: CPT

## 2024-01-03 RX ORDER — ESTRADIOL 0.1 MG/G
0.1 CREAM VAGINAL
Qty: 1 | Refills: 1 | Status: ACTIVE | COMMUNITY
Start: 2024-01-03 | End: 1900-01-01

## 2024-01-04 ENCOUNTER — RX RENEWAL (OUTPATIENT)
Age: 69
End: 2024-01-04

## 2024-02-22 ENCOUNTER — APPOINTMENT (OUTPATIENT)
Dept: ORTHOPEDIC SURGERY | Facility: CLINIC | Age: 69
End: 2024-02-22
Payer: COMMERCIAL

## 2024-02-22 VITALS — BODY MASS INDEX: 23.59 KG/M2 | WEIGHT: 117 LBS | HEIGHT: 59 IN

## 2024-02-22 DIAGNOSIS — M54.16 RADICULOPATHY, LUMBAR REGION: ICD-10-CM

## 2024-02-22 PROCEDURE — 99214 OFFICE O/P EST MOD 30 MIN: CPT

## 2024-02-22 RX ORDER — DICLOFENAC SODIUM 75 MG/1
75 TABLET, DELAYED RELEASE ORAL
Qty: 60 | Refills: 0 | Status: ACTIVE | COMMUNITY
Start: 2024-02-22 | End: 1900-01-01

## 2024-02-22 NOTE — PHYSICAL EXAM
[Normal] : Gait: normal [Kaur's Sign] : negative Kaur's sign [Pronator Drift] : negative pronator drift [SLR] : negative straight leg raise [de-identified] : 5 out of 5 motor strength, sensation is intact and symmetrical full range of motion flexion extension and rotation, no palpatory tenderness full range of motion of hips knees shoulders and elbows (all four extremities), no atrophy, negative straight leg raise, no pathological reflexes, no swelling, normal ambulation, no apparent distress skin is intact, no palpable lymph nodes, no upper or lower extremity instability, alert and oriented x3 and normal mood. Normal finger-to nose test.  [de-identified] : I reviewed, interpreted and clinically correlated the following outside imaging studies, MR SPINE LUMBAR - ORDERED BY: ABNER CARLOS  PROCEDURE DATE: 11/04/2023  INTERPRETATION: CLINICAL INFORMATION: Left lower extremity radiculopathy  ADDITIONAL CLINICAL INFORMATION: Not Applicable  TECHNIQUE: Multiplanar, multisequence MRI was performed of the lumbar spine. IV Contrast:  PRIOR STUDIES: No priors available for comparison.  FINDINGS:  LOCALIZER: No additional findings. BONES: There is no fracture or bone marrow edema. ALIGNMENT: The alignment is normal. SACROILIAC JOINTS/SACRUM: There is no sacral fracture. The SI joints are partially visualized but are intact. CONUS AND CAUDA EQUINA: The distal cord and conus are normal in signal. Conus terminates at L1. VISUALIZED INTRAPELVIC/INTRA-ABDOMINAL SOFT TISSUES: Normal. PARASPINAL SOFT TISSUES: Normal.   INDIVIDUAL LEVELS:  LOWER THORACIC SPINE: No spinal canal or neuroforaminal stenosis.  L1-L2: Broad-based posterior disc bulge and mild bilateral facet arthropathy without significant central canal or neural foraminal narrowing. L2-L3: No disc herniation, central canal or neural foraminal narrowing. L3-L4: Small broad-based posterior disc bulge and mild bilateral facet arthropathy without significant central canal or neural foraminal narrowing. L4-L5: Broad-based posterior disc bulge with superimposed left paracentral/intraforaminal protrusion in conjunction with moderate bilateral facet arthropathy results in moderate bilateral neural foraminal narrowing and moderate to severe central canal narrowing. There is bilateral lateral recess stenosis. L5-S1: Moderate bilateral facet arthropathy. No disc herniation, central canal or neural foraminal narrowing.   IMPRESSION:  Multilevel discogenic degenerative disease and facet arthropathy of the lumbar spine, most pronounced at L4-L5 where there is moderate to severe central canal narrowing and moderate bilateral neural foraminal narrowing. No areas of additional central canal or neural foraminal narrowing within the lumbar spine.  --- End of Report ---.

## 2024-02-22 NOTE — HISTORY OF PRESENT ILLNESS
[de-identified] : 68 year old female with L4-5 stenosis presents for follow up.  Last seen in Nov 2023 for left L5 radiculopathy and was prescribed MDP at the time. She took the MDP but did not feel relief. She states that her pain has worsened.  Pain is worse at night. Standing aggravates the pain.  Has not tried PT or chiropractic care. Denies DEAN.  No fever chills sweats nausea vomiting no bowel or bladder dysfunction, no recent weight loss or gain no night pain. This history is in addition to the intake form that I personally reviewed.  [Stable] : stable

## 2024-02-22 NOTE — DISCUSSION/SUMMARY
[de-identified] : L4-5 moderate-severe stenosis. Left sided radiculopathy. Discussed all options.  Dr. Renteria. Left L4-5 SNRB. Refuses surgery for now.  Diclofenac PRN.  All options discussed including rest, medicine, home exercise, acupuncture, Chiropractic care, Physical Therapy, Pain management, and last resort surgery. All questions were answered, all alternatives discussed and the patient is in complete agreement with the treatment plan which the patient contributed to and discussed with me through the shared decision making process. Follow-up appointment as instructed. Any issues and the patient will call or come in sooner.

## 2024-02-27 ENCOUNTER — APPOINTMENT (OUTPATIENT)
Dept: ORTHOPEDIC SURGERY | Facility: CLINIC | Age: 69
End: 2024-02-27
Payer: COMMERCIAL

## 2024-02-27 VITALS — BODY MASS INDEX: 23.59 KG/M2 | HEIGHT: 59 IN | WEIGHT: 117 LBS

## 2024-02-27 DIAGNOSIS — Z96.652 PRESENCE OF LEFT ARTIFICIAL KNEE JOINT: ICD-10-CM

## 2024-02-27 PROCEDURE — 99214 OFFICE O/P EST MOD 30 MIN: CPT

## 2024-02-27 NOTE — DISCUSSION/SUMMARY
[de-identified] : This patient has a well-functioning left total knee arthroplasty.  She has severe left lower extremity radiculopathy which I think is causing her some left thigh weakness and left leg weakness.  She should continue to follow-up with the spine surgery team.  The patient is not an appropriate candidate for surgical intervention at this time. An extensive discussion was conducted on the natural history of the disease and the variety of surgical and non-surgical options available to the patient including, but not limited to non-steroidal anti-inflammatory medications, steroid injections, physical therapy, maintenance of ideal body weight, and reduction of activity.  Physical therapy and mobic rx. Follow-up with me in 6 months. The patient will schedule an appointment as needed.

## 2024-02-27 NOTE — HISTORY OF PRESENT ILLNESS
[de-identified] : This is very nice 68-year-old female experiencing left thigh pain, which is mild in intensity.  She has a left total knee arthroplasty performed by ra.  She feels tightness around the knee.  She is not taking NSAIDs.  She has been doing physical therapy.  She has severe left lower extremity radiculopathy being seen by spine surgery.  She is currently recommended for selective nerve root injections for this.  The pain somewhat limits activities of daily living. Walking tolerance is mildly reduced.

## 2024-02-27 NOTE — PHYSICAL EXAM
[de-identified] : Patient is well nourished, well-developed, in no acute distress, with appropriate mood and affect. The patient is oriented to time, place, and person. Respirations are even and unlabored. Gait evaluation does not reveal a limp. There is no inguinal adenopathy. Examination of the contralateral knee shows normal range of motion, strength, no tenderness, and intact skin. The operative limb is well-perfused, has a well appearing surgical scar, and shows a grossly normal motor and sensory examination. Knee motion is painless and the left knee moves from 0-115 degrees confirmed with a goniometer. The knee is stable within that range-of-motion to AP and ML stress. The alignment of the knee is neutral. Muscle strength is normal. Quadriceps and hamstring muscle strength is normal bilaterally. Pedal pulses are palpable.

## 2024-03-05 ENCOUNTER — APPOINTMENT (OUTPATIENT)
Dept: PAIN MANAGEMENT | Facility: CLINIC | Age: 69
End: 2024-03-05
Payer: COMMERCIAL

## 2024-03-05 VITALS — BODY MASS INDEX: 23.59 KG/M2 | WEIGHT: 117 LBS | HEIGHT: 59 IN

## 2024-03-05 DIAGNOSIS — M54.16 RADICULOPATHY, LUMBAR REGION: ICD-10-CM

## 2024-03-05 PROCEDURE — 99204 OFFICE O/P NEW MOD 45 MIN: CPT

## 2024-03-05 RX ORDER — NIRMATRELVIR AND RITONAVIR 300-100 MG
20 X 150 MG & KIT ORAL
Qty: 1 | Refills: 0 | Status: DISCONTINUED | COMMUNITY
Start: 2023-05-04 | End: 2024-03-05

## 2024-03-05 NOTE — ASSESSMENT
[FreeTextEntry1] : Subjective findings This 69-year-old female presents with pain consistent with lumbar radiculopathy.  Patient's history goes back to after knee replacement on the left side patient developed pain radiating from her lower back down to her ankle.  This persisted and she was evaluated by her orthopedist and eventually spine surgeon who agreed that probably a radiculopathy due to the change of gait secondary to the replacement.  Patient denies any bowel or bladder incontinence or any progressive weakness but says the pain is problematic.  Patient's been actively involved in physical therapy but states that that is not helping with the pain.  Patient presents to us for evaluation for possible epidural steroid injections.  The CV/Pulm/GI/Heme/Renal/Hepatic//Psych/Endo systems are reviewed. A full ROS was performed and reviewed with the patient today, see intake form.  Average pain score for the month is 6 out of ten. The patient's current medications are documented to the best of their ability. The patient has failed conservative therapies to include medical management, and physical activity to treat the pain. The patient has decreased function secondary to the pain. Objective findings Imaging studies are consistent with the patient's pain complaints.  Patient is able to get to a standing position without assistance.  Motor and sensory exams appear grossly intact.  The patient ambulates without an assistive device. Assessment Lumbar radiculopathy Plan Spoke to patient about epidural steroid injections. Explained risks, benefits and alternatives including but not limited to the risk of infection, bleeding, headache, syncopal episode, failure to resolve issues, allergic reaction, symptom recurrence, allergic reaction, nerve injury, and increased pain. The patient understands the risks. All questions were answered. The patient is willing to proceed. The importance of increasing exercise after the injection is also stressed. The use of the injection as a jump start so that the patient can do more exercise, but that the exercise is the long term answer for the patient is reviewed. The patient states understanding.

## 2024-03-05 NOTE — HISTORY OF PRESENT ILLNESS
[FreeTextEntry1] : THE PATIENT IS 69 year OLD RT HAND DOMINANT female  WHO PRESENT TODAY IN OFFICE WITH LSPINE RADATING THROUHG THE SIDE DWN RT LEG TO THE TOES, WOULD LIKE YOGESH DISS PLAN OF CARE FOR PAIN   DATE OF INJURY/ONSET: ON GOING ISSUE      PAIN LEVEL:  [phone]   QUALITY OF SYMPTOMS:  PRESSURE, TINGLING     IMPROVES WITH:  SITTING, ICE HEAT      WORSE WITH:  STANDING PRO LONG, WALKING PRO LONG      PRIOR TREATMENT:  PT HAS NOT DONE PT FOR LSPINE          PRIOR [id number] LUCITAPINE

## 2024-03-22 ENCOUNTER — TRANSCRIPTION ENCOUNTER (OUTPATIENT)
Age: 69
End: 2024-03-22

## 2024-03-22 NOTE — ASU PATIENT PROFILE, ADULT - NS PRO ABUSE SCREEN SUSPICION NEGLECT YN
Medication refilled.   I called Daniel his PCW to find out when he had his last blood draw.   I left him a message that pt would need one. I placed order.    no

## 2024-03-22 NOTE — ASU PATIENT PROFILE, ADULT - NSICDXPASTMEDICALHX_GEN_ALL_CORE_FT
PAST MEDICAL HISTORY:  COVID-19     Cyst, ovarian     HTN (hypertension)     OA (osteoarthritis)      PAST MEDICAL HISTORY:  COVID-19 5/2023    Cyst, ovarian     HTN (hypertension)     OA (osteoarthritis)

## 2024-03-22 NOTE — ASU PATIENT PROFILE, ADULT - NSICDXPASTSURGICALHX_GEN_ALL_CORE_FT
PAST SURGICAL HISTORY:  Ovarian cyst rupture     S/P total hysterectomy      PAST SURGICAL HISTORY:  History of tonsillectomy     History of total right knee replacement (TKR) left 7/2023    Ovarian cyst rupture     S/P total hysterectomy

## 2024-03-24 ENCOUNTER — RX RENEWAL (OUTPATIENT)
Age: 69
End: 2024-03-24

## 2024-03-25 ENCOUNTER — OUTPATIENT (OUTPATIENT)
Dept: OUTPATIENT SERVICES | Facility: HOSPITAL | Age: 69
LOS: 1 days | End: 2024-03-25
Payer: COMMERCIAL

## 2024-03-25 ENCOUNTER — APPOINTMENT (OUTPATIENT)
Dept: ORTHOPEDIC SURGERY | Facility: HOSPITAL | Age: 69
End: 2024-03-25
Payer: COMMERCIAL

## 2024-03-25 VITALS
OXYGEN SATURATION: 100 % | DIASTOLIC BLOOD PRESSURE: 86 MMHG | WEIGHT: 117.95 LBS | TEMPERATURE: 98 F | SYSTOLIC BLOOD PRESSURE: 126 MMHG | HEIGHT: 59 IN | HEART RATE: 66 BPM | RESPIRATION RATE: 15 BRPM

## 2024-03-25 VITALS
DIASTOLIC BLOOD PRESSURE: 75 MMHG | SYSTOLIC BLOOD PRESSURE: 115 MMHG | RESPIRATION RATE: 13 BRPM | OXYGEN SATURATION: 99 % | HEART RATE: 67 BPM

## 2024-03-25 DIAGNOSIS — Z90.710 ACQUIRED ABSENCE OF BOTH CERVIX AND UTERUS: Chronic | ICD-10-CM

## 2024-03-25 DIAGNOSIS — N83.209 UNSPECIFIED OVARIAN CYST, UNSPECIFIED SIDE: Chronic | ICD-10-CM

## 2024-03-25 DIAGNOSIS — M54.16 RADICULOPATHY, LUMBAR REGION: ICD-10-CM

## 2024-03-25 DIAGNOSIS — Z96.651 PRESENCE OF RIGHT ARTIFICIAL KNEE JOINT: Chronic | ICD-10-CM

## 2024-03-25 DIAGNOSIS — Z90.89 ACQUIRED ABSENCE OF OTHER ORGANS: Chronic | ICD-10-CM

## 2024-03-25 PROCEDURE — 64484 NJX AA&/STRD TFRM EPI L/S EA: CPT | Mod: 59,LT

## 2024-03-25 PROCEDURE — 64484 NJX AA&/STRD TFRM EPI L/S EA: CPT | Mod: LT

## 2024-03-25 PROCEDURE — 64483 NJX AA&/STRD TFRM EPI L/S 1: CPT | Mod: LT

## 2024-03-25 RX ORDER — AMLODIPINE AND VALSARTAN 5; 320 MG/1; MG/1
1 TABLET, FILM COATED ORAL
Qty: 0 | Refills: 0 | DISCHARGE

## 2024-05-24 NOTE — DISCHARGE NOTE PROVIDER - NSDCHC_MEDRECSTATUS_GEN_ALL_CORE
Spoke with pt and scheduled new pt appt with Dr. Campbell for 6/27/24.   Admission Reconciliation is Completed  Discharge Reconciliation is Not Complete Admission Reconciliation is Completed  Discharge Reconciliation is Completed

## 2024-06-03 ENCOUNTER — RX RENEWAL (OUTPATIENT)
Age: 69
End: 2024-06-03

## 2024-06-03 RX ORDER — ESTRADIOL 10 UG/1
10 TABLET, FILM COATED VAGINAL
Qty: 24 | Refills: 0 | Status: ACTIVE | COMMUNITY
Start: 2021-03-09 | End: 1900-01-01

## 2024-06-11 ENCOUNTER — NON-APPOINTMENT (OUTPATIENT)
Age: 69
End: 2024-06-11

## 2024-07-30 PROBLEM — U07.1 COVID-19: Chronic | Status: ACTIVE | Noted: 2023-06-26

## 2024-08-09 ENCOUNTER — APPOINTMENT (OUTPATIENT)
Dept: PAIN MANAGEMENT | Facility: CLINIC | Age: 69
End: 2024-08-09

## 2024-08-09 ENCOUNTER — APPOINTMENT (OUTPATIENT)
Dept: MRI IMAGING | Facility: CLINIC | Age: 69
End: 2024-08-09

## 2024-08-09 PROCEDURE — 99213 OFFICE O/P EST LOW 20 MIN: CPT

## 2024-08-09 PROCEDURE — 72148 MRI LUMBAR SPINE W/O DYE: CPT

## 2024-08-11 NOTE — ASSESSMENT
[FreeTextEntry1] : Interim history The patient returns with pain that has been treated adequately in the past with epidural steroid injections.  The patient's complaints are consistent with radiculopathy. Patient's ADL's increase with prior DEAN.   The last injection gave greater than 60% reduction of the pain but now there is a return of symptoms. The patient is requesting a subsequent epidural steroid injection to alleviate pain and improve functional ability and quality of life.  Patient is a candidate. Objective findings Since the last visit, there have been no new imaging studies. The patient has no new symptoms except the return of the their pain complaints. Motor and sensory function is unchanged. Plan Prior to any intervention, the patient will obtain a new MRI since the pain complaint has changed. Spoke to patient about epidural steroid injections. Explained risks, benefits and alternatives including but not limited to the risk of infection, bleeding, headache, syncopal episode, failure to resolve issues, allergic reaction, symptom recurrence, allergic reaction, nerve injury, and increased pain. The patient understands the risks. All questions were answered. The patient is willing to proceed.  This note was generated by using Dragon medical dictation software.  A reasonable effort has been made for proofreading its contents, but typos may still remain.  If there are any questions or points of clarification needed, please notify my office.

## 2024-08-11 NOTE — HISTORY OF PRESENT ILLNESS
[FreeTextEntry1] : Ms. SINGH SYLVESTER IS FOLLOWING UP FOR  RETURNING PAIN OF LSPINE RADATING ACROSS  . PT STATES LAST DEAN 2024 DECREASED PAIN AND INCREASED ADL. [facility] PAIN RETURNED 2 WKS AGO    PAIN LEVEL BEFORE DEAN: 5-10/10 PAIN LEVEL AFTER DEAN:2/10 CURRENT PAIN LEV: 8-10/10 INTERIMATE    DATE OF INJURY/ONSET: ON GOING ISSUE      PAIN LEVEL:  5-10/10   QUALITY OF SYMPTOMS:  PRESSURE, TINGLING     IMPROVES WITH:  SITTING, ICE HEAT      WORSE WITH:  STANDING PRO LONG, WALKING PRO LONG      PRIOR TREATMENT:  PT HAS NOT DONE PT FOR LSPINE          PRIOR [id number] LSPINE

## 2024-08-11 NOTE — HISTORY OF PRESENT ILLNESS
[FreeTextEntry1] : Ms. SINGH SYLVESTER IS FOLLOWING UP FOR  RETURNING PAIN OF LSPINE RADATING ACROSS  . PT STATES LAST DEAN 2024 DECREASED PAIN AND INCREASED ADL. [facility] PAIN RETURNED 2 WKS AGO    PAIN LEVEL BEFORE DEAN: 5-10/10 PAIN LEVEL AFTER DEAN:2/10 CURRENT PAIN LEV: 8-10/10 INTERIMATE    DATE OF INJURY/ONSET: ON GOING ISSUE      PAIN LEVEL:  5-10/10   QUALITY OF SYMPTOMS:  PRESSURE, TINGLING     IMPROVES WITH:  SITTING, ICE HEAT      WORSE WITH:  STANDING PRO LONG, WALKING PRO LONG      PRIOR TREATMENT:  PT HAS NOT DONE PT FOR LSPINE          PRIOR IMAGIN2023 LSPINE

## 2024-08-14 ENCOUNTER — APPOINTMENT (OUTPATIENT)
Dept: PAIN MANAGEMENT | Facility: CLINIC | Age: 69
End: 2024-08-14
Payer: COMMERCIAL

## 2024-08-14 DIAGNOSIS — M54.16 RADICULOPATHY, LUMBAR REGION: ICD-10-CM

## 2024-08-14 PROCEDURE — 99214 OFFICE O/P EST MOD 30 MIN: CPT

## 2024-08-14 NOTE — ASSESSMENT
[FreeTextEntry1] : Interim history The patient returns with pain that has been treated adequately in the past with epidural steroid injections.  The patient's complaints are consistent with radiculopathy. Patient's ADL's increase with prior DEAN.   The last injection gave greater than 60% reduction of the pain but now there is a return of symptoms. The patient is requesting a subsequent epidural steroid injection to alleviate pain and improve functional ability and quality of life.  Patient is a candidate. Objective findings Since the last visit, there have been no new imaging studies. The patient has no new symptoms except the return of the their pain complaints. Motor and sensory function is unchanged. Plan Spoke to patient about epidural steroid injections. Explained risks, benefits and alternatives including but not limited to the risk of infection, bleeding, headache, syncopal episode, failure to resolve issues, allergic reaction, symptom recurrence, allergic reaction, nerve injury, and increased pain. The patient understands the risks. All questions were answered. The patient is willing to proceed.  This note was generated by using Dragon medical dictation software.  A reasonable effort has been made for proofreading its contents, but typos may still remain.  If there are any questions or points of clarification needed, please notify my office.

## 2024-08-14 NOTE — HISTORY OF PRESENT ILLNESS
[FreeTextEntry1] : Ms. SINGH SYLVESTER IS FOLLOWING UP FOR  RETURNING PAIN OF LSPINE RADATING ACROSS  . PT STATES LAST DEAN 2024 DECREASED PAIN AND INCREASED ADL. [facility] PAIN RETURNED 2 WKS AGO    PAIN LEVEL BEFORE DEAN: 5-10/10 PAIN LEVEL AFTER DEAN:2/10 CURRENT PAIN LEV: 8-10/10 INTERIMATE    DATE OF INJURY/ONSET: ON GOING ISSUE      PAIN LEVEL:  5-10/10   QUALITY OF SYMPTOMS:  PRESSURE, TINGLING     IMPROVES WITH:  SITTING, ICE HEAT      WORSE WITH:  STANDING PRO LONG, WALKING PRO LONG      PRIOR TREATMENT:  PT HAS NOT DONE PT FOR LSPINE          PRIOR IMAGIN2023 LSPINE

## 2024-08-15 ENCOUNTER — APPOINTMENT (OUTPATIENT)
Dept: ORTHOPEDIC SURGERY | Facility: CLINIC | Age: 69
End: 2024-08-15
Payer: COMMERCIAL

## 2024-08-15 VITALS — HEIGHT: 59 IN | WEIGHT: 122 LBS | BODY MASS INDEX: 24.6 KG/M2

## 2024-08-15 DIAGNOSIS — M17.11 UNILATERAL PRIMARY OSTEOARTHRITIS, RIGHT KNEE: ICD-10-CM

## 2024-08-15 DIAGNOSIS — Z96.652 PRESENCE OF LEFT ARTIFICIAL KNEE JOINT: ICD-10-CM

## 2024-08-15 PROCEDURE — 99215 OFFICE O/P EST HI 40 MIN: CPT

## 2024-08-15 PROCEDURE — G2211 COMPLEX E/M VISIT ADD ON: CPT

## 2024-08-15 PROCEDURE — 73564 X-RAY EXAM KNEE 4 OR MORE: CPT | Mod: RT

## 2024-08-15 NOTE — HISTORY OF PRESENT ILLNESS
[de-identified] :  This is very nice 69-year-old female experiencing right knee pain, which is severe in intensity. The pain substantially limits activities of daily living. Walking tolerance is reduced.  She has known diagnosis of severe right knee osteoarthritis.  Medication and activity modification have been minimally effective for a period lasting greater than three months in duration. Assistive devices and external support were not deemed by the patient to be helpful in improving their function. Due to the severity of osteoarthritis and level of pain, physical therapy is contraindicated. Pain and restriction of function are intolerable at this time. The patient denies any radiation of the pain to the feet and it is not associated with numbness, tingling, or weakness.She is also doing very well from a left total knee arthroplasty and her previous pains are resolved.  She is still treating her lumbar spinal stenosis and radiculopathy with injections which are helping.

## 2024-08-15 NOTE — PHYSICAL EXAM
[de-identified] : Patient is well nourished, well-developed, in no acute distress, with appropriate mood and affect. The patient is oriented to time, place, and person. Respirations are even and unlabored. Gait evaluation does reveal a limp. There is no inguinal adenopathy. Bilateral limbs are well-perfused, without skin lesions, shows a grossly normal motor and sensory examination. The right knee motion is significantly reduced and does cause significant pain. The right knee moves from 0 to 125 degrees. The knee is stable within that range-of-motion to AP and ML stress. The alignment of the knee is 5 degrees varus. Muscle strength is normal. Pedal pulses are palpable. Hip examination was negative. The left knee motion is painless and the left knee moves from 0 to 125 degrees. The knee is stable within that range-of-motion to AP and ML stress. The alignment of the knee is neutral.  Well-healed midline surgical scar.  Muscle strength is normal. Pedal pulses are palpable. Hip examination was negative. [de-identified] : Long standing knee, AP knee, lateral knee, and patellar views of the right knee were ordered and taken in the office and demonstrate degenerative joint disease of the knee with joint space narrowing, osteophyte formation, and subchondral sclerosis.

## 2024-08-15 NOTE — DISCUSSION/SUMMARY
[de-identified] : This patient is severe right knee osteoarthritis.  She has tried and failed a course of conservative management and would like to proceed with a right total knee arthroplasty using robotic navigation for assistance.  The patient is an appropriate candidate for consideration of right total knee replacement. An extensive discussion was conducted of the natural history of the disease and the variety of surgical and non-surgical treatment options available to the patient. A risk/benefit analysis was discussed with the patient reviewing the advantages and disadvantages of surgical intervention at this time. Both the level and length of the patient's pain have made additional conservative treatment measures consisting of NSAIDs, physical therapy, corticosteroids, and/or viscosupplementation contraindicated. A full explanation was given of the nature and the purpose of the procedure and anesthesia, its benefits, possible alternative methods of diagnosis or treatment, the risks involved, the possibility of complications, the foreseeable consequences of the procedure and the possible results of the non-treatment. I reviewed the plan of care as well as used a model of a total knee implant equivalent to the one that will be used for their total knee joint replacement. The ability to secure the implant utilizing cement or cementless (press-fit) was discussed with the patient. The patient agrees with the plan of care, as well as the use of implants for their total knee replacement.   We also discussed that if robotic/computer navigation is utilized, then additional incisions may need to be made to accommodate the computer navigation arrays, which will be placed in the femur and tibia.  No guarantee or assurance was made as to the results that may be obtained. Specifically, the risks were identified to include, but are not limited to the following: Infection, phlebitis, pulmonary embolism, death, paralysis, dislocation, pain, stiffness, instability, limp, weakness, breakage, leg-length inequality, uncontrolled bleeding, nerve injury, blood vessel injury, pressure sores, anesthetic risks, delayed healing of wound and bone, and wear and loosening. Further discussion was undertaken with the patient about the details of surgical preparation, treatment, and postoperative rehabilitation including medical clearance, autotransfusion, the hospital course, and the postoperative rehabilitation involved. All in all, I feel that this patient is a good candidate for surgical reconstruction.

## 2024-08-28 ENCOUNTER — TRANSCRIPTION ENCOUNTER (OUTPATIENT)
Age: 69
End: 2024-08-28

## 2024-08-28 NOTE — ASU PATIENT PROFILE, ADULT - NSICDXPASTSURGICALHX_GEN_ALL_CORE_FT
PAST SURGICAL HISTORY:  History of tonsillectomy     History of total right knee replacement (TKR) left 7/2023    Ovarian cyst rupture     S/P total hysterectomy

## 2024-08-28 NOTE — ASU PATIENT PROFILE, ADULT - NSICDXPASTMEDICALHX_GEN_ALL_CORE_FT
PAST MEDICAL HISTORY:  COVID-19 5/2023    Cyst, ovarian     HTN (hypertension)     OA (osteoarthritis)

## 2024-08-28 NOTE — ASU PATIENT PROFILE, ADULT - PRO ARRIVE FROM
Clinic Care Coordination Contact    Pt dis-enrolled due to [facility] x3.    HENRY Vee   Social Work Primary Care Clinic Care Coordinator   [facility]  [phone]  [email or web address]   
Clinic Care Coordination Contact  [facility]/Voicemail    Clinical Data: Care Coordinator Outreach  Outreach attempted x 3.  Left message on patient's voicemail with call back information and requested return call.  CHW sent unable to contact letter with contact information and patient's health maintenance list via [employer] on 3/21/22.     3/22  STEFANIE note:   CHW in process of contacting pt. CHW to attempt one more time.         Plan: CHW will route encounter to [facility] to approve Care Coordination disenrollment letter to be sent via [employer]. CHW will do no further outreaches at this time unless delegated by [facility].    Miriam PUGA  Community Health Worker  [facility] Care Coordination  [facility]  [email or web address]   Office: [phone]    
home

## 2024-08-29 ENCOUNTER — APPOINTMENT (OUTPATIENT)
Dept: ORTHOPEDIC SURGERY | Facility: HOSPITAL | Age: 69
End: 2024-08-29
Payer: COMMERCIAL

## 2024-08-29 ENCOUNTER — OUTPATIENT (OUTPATIENT)
Dept: OUTPATIENT SERVICES | Facility: HOSPITAL | Age: 69
LOS: 1 days | End: 2024-08-29
Payer: COMMERCIAL

## 2024-08-29 VITALS
SYSTOLIC BLOOD PRESSURE: 136 MMHG | DIASTOLIC BLOOD PRESSURE: 78 MMHG | OXYGEN SATURATION: 98 % | RESPIRATION RATE: 14 BRPM

## 2024-08-29 VITALS
SYSTOLIC BLOOD PRESSURE: 116 MMHG | TEMPERATURE: 98 F | RESPIRATION RATE: 13 BRPM | OXYGEN SATURATION: 98 % | WEIGHT: 119.93 LBS | DIASTOLIC BLOOD PRESSURE: 71 MMHG | HEIGHT: 57 IN | HEART RATE: 64 BPM

## 2024-08-29 DIAGNOSIS — Z90.89 ACQUIRED ABSENCE OF OTHER ORGANS: Chronic | ICD-10-CM

## 2024-08-29 DIAGNOSIS — M54.16 RADICULOPATHY, LUMBAR REGION: ICD-10-CM

## 2024-08-29 DIAGNOSIS — Z90.710 ACQUIRED ABSENCE OF BOTH CERVIX AND UTERUS: Chronic | ICD-10-CM

## 2024-08-29 DIAGNOSIS — N83.209 UNSPECIFIED OVARIAN CYST, UNSPECIFIED SIDE: Chronic | ICD-10-CM

## 2024-08-29 DIAGNOSIS — Z96.651 PRESENCE OF RIGHT ARTIFICIAL KNEE JOINT: Chronic | ICD-10-CM

## 2024-08-29 PROCEDURE — 62323 NJX INTERLAMINAR LMBR/SAC: CPT

## 2024-08-29 NOTE — ASU PREOP CHECKLIST - HEIGHT IN INCHES
Detail Level: Simple Render Risk Assessment In Note?: no Additional Notes: Performing Provider: Dr Ángel Omalley Provider (if applicable):\\nProcedure: Mohs\\nSurgical Location: left nasal ala\\nDiagnosis (per pathology report): bcc\\nSize of lesion (size provided on pathology report):\\n\\nSee a physician for any heart conditions (If yes, who and for what): Y - MI, stents, HTN\\nArtificial Heart Valves: N\\nMitral valve prolapse: N\\nHeart Murmur: N\\nPacemaker: N\\nDefibrillator: N\\nArtificial joints (if yes, indicate location and year): N\\nDoes the patient pre-op medicate with antibiotics (if yes, what medication): N\\n**Pharmacy: N\\nHistory of renal disease or on dialysis: N\\nHistory of liver disease: N\\nHistory of bleeding disorder: N\\nLow platelet count (if the patient can provider): N\\nWill patient discontinue blood thinners, including NSAIDS (Advil, Motrin, ibuprofen & fish oil) (discontinue only by provider order): N\\nImmunosuppressed: N\\nPatient verbalizes understanding of pre-operative instructions:  Y\\n\\nNotes (if applicable): Pt takes Eliquis BID & Clopidogrel QD 9

## 2024-09-10 ENCOUNTER — NON-APPOINTMENT (OUTPATIENT)
Age: 69
End: 2024-09-10

## 2024-10-14 ENCOUNTER — NON-APPOINTMENT (OUTPATIENT)
Age: 69
End: 2024-10-14

## 2024-10-15 ENCOUNTER — RESULT REVIEW (OUTPATIENT)
Age: 69
End: 2024-10-15

## 2024-10-15 ENCOUNTER — OUTPATIENT (OUTPATIENT)
Dept: OUTPATIENT SERVICES | Facility: HOSPITAL | Age: 69
LOS: 1 days | End: 2024-10-15
Payer: COMMERCIAL

## 2024-10-15 VITALS
WEIGHT: 119.05 LBS | TEMPERATURE: 97 F | HEIGHT: 61 IN | DIASTOLIC BLOOD PRESSURE: 84 MMHG | HEART RATE: 63 BPM | SYSTOLIC BLOOD PRESSURE: 122 MMHG | RESPIRATION RATE: 16 BRPM | OXYGEN SATURATION: 97 %

## 2024-10-15 DIAGNOSIS — Z90.710 ACQUIRED ABSENCE OF BOTH CERVIX AND UTERUS: Chronic | ICD-10-CM

## 2024-10-15 DIAGNOSIS — Z96.651 PRESENCE OF RIGHT ARTIFICIAL KNEE JOINT: Chronic | ICD-10-CM

## 2024-10-15 DIAGNOSIS — I10 ESSENTIAL (PRIMARY) HYPERTENSION: ICD-10-CM

## 2024-10-15 DIAGNOSIS — N83.209 UNSPECIFIED OVARIAN CYST, UNSPECIFIED SIDE: Chronic | ICD-10-CM

## 2024-10-15 DIAGNOSIS — M17.11 UNILATERAL PRIMARY OSTEOARTHRITIS, RIGHT KNEE: ICD-10-CM

## 2024-10-15 DIAGNOSIS — Z96.652 PRESENCE OF LEFT ARTIFICIAL KNEE JOINT: Chronic | ICD-10-CM

## 2024-10-15 DIAGNOSIS — Z90.89 ACQUIRED ABSENCE OF OTHER ORGANS: Chronic | ICD-10-CM

## 2024-10-15 LAB
A1C WITH ESTIMATED AVERAGE GLUCOSE RESULT: 5.6 % — SIGNIFICANT CHANGE UP (ref 4–5.6)
ANION GAP SERPL CALC-SCNC: 13 MMOL/L — SIGNIFICANT CHANGE UP (ref 5–17)
BUN SERPL-MCNC: 13 MG/DL — SIGNIFICANT CHANGE UP (ref 7–23)
CALCIUM SERPL-MCNC: 9.6 MG/DL — SIGNIFICANT CHANGE UP (ref 8.4–10.5)
CHLORIDE SERPL-SCNC: 103 MMOL/L — SIGNIFICANT CHANGE UP (ref 96–108)
CO2 SERPL-SCNC: 22 MMOL/L — SIGNIFICANT CHANGE UP (ref 22–31)
CREAT SERPL-MCNC: 0.36 MG/DL — LOW (ref 0.5–1.3)
EGFR: 110 ML/MIN/1.73M2 — SIGNIFICANT CHANGE UP
ESTIMATED AVERAGE GLUCOSE: 114 MG/DL — SIGNIFICANT CHANGE UP (ref 68–114)
GLUCOSE SERPL-MCNC: 97 MG/DL — SIGNIFICANT CHANGE UP (ref 70–99)
HCT VFR BLD CALC: 41 % — SIGNIFICANT CHANGE UP (ref 34.5–45)
HGB BLD-MCNC: 13.8 G/DL — SIGNIFICANT CHANGE UP (ref 11.5–15.5)
MCHC RBC-ENTMCNC: 28.5 PG — SIGNIFICANT CHANGE UP (ref 27–34)
MCHC RBC-ENTMCNC: 33.7 GM/DL — SIGNIFICANT CHANGE UP (ref 32–36)
MCV RBC AUTO: 84.5 FL — SIGNIFICANT CHANGE UP (ref 80–100)
MRSA PCR RESULT.: SIGNIFICANT CHANGE UP
NRBC # BLD: 0 /100 WBCS — SIGNIFICANT CHANGE UP (ref 0–0)
PLATELET # BLD AUTO: 260 K/UL — SIGNIFICANT CHANGE UP (ref 150–400)
POTASSIUM SERPL-MCNC: 3.8 MMOL/L — SIGNIFICANT CHANGE UP (ref 3.5–5.3)
POTASSIUM SERPL-SCNC: 3.8 MMOL/L — SIGNIFICANT CHANGE UP (ref 3.5–5.3)
RBC # BLD: 4.85 M/UL — SIGNIFICANT CHANGE UP (ref 3.8–5.2)
RBC # FLD: 12.8 % — SIGNIFICANT CHANGE UP (ref 10.3–14.5)
S AUREUS DNA NOSE QL NAA+PROBE: SIGNIFICANT CHANGE UP
SODIUM SERPL-SCNC: 138 MMOL/L — SIGNIFICANT CHANGE UP (ref 135–145)
WBC # BLD: 5.4 K/UL — SIGNIFICANT CHANGE UP (ref 3.8–10.5)
WBC # FLD AUTO: 5.4 K/UL — SIGNIFICANT CHANGE UP (ref 3.8–10.5)

## 2024-10-15 PROCEDURE — 80048 BASIC METABOLIC PNL TOTAL CA: CPT

## 2024-10-15 PROCEDURE — 85027 COMPLETE CBC AUTOMATED: CPT

## 2024-10-15 PROCEDURE — G0463: CPT

## 2024-10-15 PROCEDURE — 87640 STAPH A DNA AMP PROBE: CPT

## 2024-10-15 PROCEDURE — 73700 CT LOWER EXTREMITY W/O DYE: CPT | Mod: MC

## 2024-10-15 PROCEDURE — 87641 MR-STAPH DNA AMP PROBE: CPT

## 2024-10-15 PROCEDURE — 73700 CT LOWER EXTREMITY W/O DYE: CPT | Mod: 26,RT,MC

## 2024-10-15 PROCEDURE — 83036 HEMOGLOBIN GLYCOSYLATED A1C: CPT

## 2024-10-15 NOTE — H&P PST ADULT - PROBLEM SELECTOR PLAN 1
Scheduled for Left Total Knee Arthroplasty Robotic Assisted With Conrad  ERP  Labs: cbc, bmp, a1c, t&s, mrsa/mssa, CT knee/Conrad  Dos: fingerstick, abo    PST instructions provided. Surgical soap given to patient. Patient verbalized understanding. scheduled Right Total Knee Arthroplasty With Conrad with Dr. Cazares on 10/28/2024  -preop instructions given & ERP instructions  -labs: CBC, BMP, A1c, MRSA done in PST  -CT for right knee w/out cont done today  -DOS: FS

## 2024-10-15 NOTE — H&P PST ADULT - NSICDXPASTSURGICALHX_GEN_ALL_CORE_FT
PAST SURGICAL HISTORY:  History of tonsillectomy     Ovarian cyst rupture     S/P total hysterectomy     S/P total knee replacement, left

## 2024-10-15 NOTE — H&P PST ADULT - ASSESSMENT
DASI score: 6.05 METS  DASI activity: ambulates with steady gait, can climb 1 flights of stairs. Activity limited due to knee pain.   Loose teeth or denture: Denies any loose teeth or dentures   Mallampati: class 2     CAPRINI SCORE [CLOT]    AGE RELATED RISK FACTORS                                                       MOBILITY RELATED FACTORS  [ ] Age 41-60 years                                            (1 Point)                  [ ] Bed rest                                                        (1 Point)  [ X] Age: 61-74 years                                           (2 Points)                 [ ] Plaster cast                                                   (2 Points)  [ ] Age= 75 years                                              (3 Points)                 [ ] Bed bound for more than 72 hours                 (2 Points)    DISEASE RELATED RISK FACTORS                                               GENDER SPECIFIC FACTORS  [ ] Edema in the lower extremities                       (1 Point)                  [ ] Pregnancy                                                     (1 Point)  [ ] Varicose veins                                               (1 Point)                  [ ] Post-partum < 6 weeks                                   (1 Point)             [ ] BMI > 25 Kg/m2                                            (1 Point)                  [ ] Hormonal therapy  or oral contraception          (1 Point)                 [ ] Sepsis (in the previous month)                        (1 Point)                  [ ] History of pregnancy complications                 (1 point)  [ ] Pneumonia or serious lung disease                                               [ ] Unexplained or recurrent                     (1 Point)           (in the previous month)                               (1 Point)  [ ] Abnormal pulmonary function test                     (1 Point)                 SURGERY RELATED RISK FACTORS  [ ] Acute myocardial infarction                              (1 Point)                 [ ]  Section                                             (1 Point)  [ ] Congestive heart failure (in the previous month)  (1 Point)               [ ] Minor surgery                                                  (1 Point)   [ ] Inflammatory bowel disease                             (1 Point)                 [ ] Arthroscopic surgery                                        (2 Points)  [ ] Central venous access                                      (2 Points)                [ ] General surgery lasting more than 45 minutes   (2 Points)       [ ] Stroke (in the previous month)                          (5 Points)               [X ] Elective arthroplasty                                         (5 Points)                                                                                                                                               HEMATOLOGY RELATED FACTORS                                                 TRAUMA RELATED RISK FACTORS  [ ] Prior episodes of VTE                                     (3 Points)                [ ] Fracture of the hip, pelvis, or leg                       (5 Points)  [ ] Positive family history for VTE                         (3 Points)                 [ ] Acute spinal cord injury (in the previous month)  (5 Points)  [ ] Prothrombin 15536 A                                     (3 Points)                 [ ] Paralysis  (less than 1 month)                             (5 Points)  [ ] Factor V Leiden                                             (3 Points)                  [ ] Multiple Trauma within 1 month                        (5 Points)  [ ] Lupus anticoagulants                                     (3 Points)                                                           [ ] Anticardiolipin antibodies                               (3 Points)                                                       [ ] High homocysteine in the blood                      (3 Points)                                             [ ] Other congenital or acquired thrombophilia      (3 Points)                                                [ ] Heparin induced thrombocytopenia                  (3 Points)                                          Total Score [    7      ]    Caprini Score 0 - 2:  Low Risk, No VTE Prophylaxis required for most patients, encourage ambulation  Caprini Score 3 - 6:  At Risk, pharmacologic VTE prophylaxis is indicated for most patients (in the absence of a contraindication)  Caprini Score Greater than or = 7:  High Risk, pharmacologic VTE prophylaxis is indicated for most patients (in the absence of a contraindication)

## 2024-10-15 NOTE — H&P PST ADULT - HISTORY OF PRESENT ILLNESS
69 y/o F with PMHx of HTN, Osteoarthritis, Covid(05/4/2023, mild symptoms, took paxlovid) presents with complaints of experiencing gradually worsening left knee pain over the past few months. Patient reports she has attempted the use of NSAID's and steroid joint injections which only temporarily alleviate her symptoms. Patient is scheduled Left Total Knee Arthroplasty Robotic Assisted With Conrad with Dr. Cazares on 07/17/2023.      68 y/o F with PMHx of HTN, Osteoarthritis, Covid(05/2023), s/p left TKR 2023, presents with complaints of experiencing gradually worsening right knee pain for ~ 6-7 yrs. Patient is scheduled Right Total Knee Arthroplasty With Conrad with Dr. Cazares on 10/28/2024. Pt denies fever, chills, h/a, c/p or SOB.

## 2024-10-17 ENCOUNTER — NON-APPOINTMENT (OUTPATIENT)
Age: 69
End: 2024-10-17

## 2024-10-28 ENCOUNTER — INPATIENT (INPATIENT)
Facility: HOSPITAL | Age: 69
LOS: 0 days | Discharge: HOME | DRG: 470 | End: 2024-10-29
Attending: ORTHOPAEDIC SURGERY | Admitting: ORTHOPAEDIC SURGERY
Payer: COMMERCIAL

## 2024-10-28 ENCOUNTER — APPOINTMENT (OUTPATIENT)
Dept: ORTHOPEDIC SURGERY | Facility: HOSPITAL | Age: 69
End: 2024-10-28

## 2024-10-28 ENCOUNTER — TRANSCRIPTION ENCOUNTER (OUTPATIENT)
Age: 69
End: 2024-10-28

## 2024-10-28 VITALS
HEART RATE: 69 BPM | DIASTOLIC BLOOD PRESSURE: 80 MMHG | OXYGEN SATURATION: 99 % | SYSTOLIC BLOOD PRESSURE: 127 MMHG | HEIGHT: 61 IN | RESPIRATION RATE: 18 BRPM | WEIGHT: 119.05 LBS | TEMPERATURE: 98 F

## 2024-10-28 DIAGNOSIS — N83.209 UNSPECIFIED OVARIAN CYST, UNSPECIFIED SIDE: Chronic | ICD-10-CM

## 2024-10-28 DIAGNOSIS — Z90.89 ACQUIRED ABSENCE OF OTHER ORGANS: Chronic | ICD-10-CM

## 2024-10-28 DIAGNOSIS — Z90.710 ACQUIRED ABSENCE OF BOTH CERVIX AND UTERUS: Chronic | ICD-10-CM

## 2024-10-28 DIAGNOSIS — Z96.652 PRESENCE OF LEFT ARTIFICIAL KNEE JOINT: Chronic | ICD-10-CM

## 2024-10-28 DIAGNOSIS — Z96.651 PRESENCE OF RIGHT ARTIFICIAL KNEE JOINT: Chronic | ICD-10-CM

## 2024-10-28 DIAGNOSIS — M17.11 UNILATERAL PRIMARY OSTEOARTHRITIS, RIGHT KNEE: ICD-10-CM

## 2024-10-28 PROCEDURE — 73560 X-RAY EXAM OF KNEE 1 OR 2: CPT | Mod: 26,RT

## 2024-10-28 PROCEDURE — 20985 CPTR-ASST DIR MS PX: CPT

## 2024-10-28 PROCEDURE — 27447 TOTAL KNEE ARTHROPLASTY: CPT | Mod: RT

## 2024-10-28 DEVICE — IMP PATELLA SYMMETRIC 31X9MM: Type: IMPLANTABLE DEVICE | Site: RIGHT | Status: FUNCTIONAL

## 2024-10-28 DEVICE — INSERT TIB BEARING X3 SZ 1 9MM: Type: IMPLANTABLE DEVICE | Site: RIGHT | Status: FUNCTIONAL

## 2024-10-28 DEVICE — COMP FEM CR CMNTLSS BEADED W/ PA SZ 2 RT: Type: IMPLANTABLE DEVICE | Site: RIGHT | Status: FUNCTIONAL

## 2024-10-28 DEVICE — BASEPLATE TIB TRIATHLON TRITAN SZ 1: Type: IMPLANTABLE DEVICE | Site: RIGHT | Status: FUNCTIONAL

## 2024-10-28 DEVICE — MAKO BONE PIN 4MM X 140MM: Type: IMPLANTABLE DEVICE | Site: RIGHT | Status: FUNCTIONAL

## 2024-10-28 DEVICE — MAKO BONE PIN 4MM X 110MM: Type: IMPLANTABLE DEVICE | Site: RIGHT | Status: FUNCTIONAL

## 2024-10-28 RX ORDER — ONDANSETRON HYDROCHLORIDE 2 MG/ML
4 INJECTION, SOLUTION INTRAMUSCULAR; INTRAVENOUS ONCE
Refills: 0 | Status: DISCONTINUED | OUTPATIENT
Start: 2024-10-28 | End: 2024-10-28

## 2024-10-28 RX ORDER — PANTOPRAZOLE SODIUM 40 MG/1
40 TABLET, DELAYED RELEASE ORAL
Refills: 0 | Status: DISCONTINUED | OUTPATIENT
Start: 2024-10-28 | End: 2024-10-29

## 2024-10-28 RX ORDER — AMLODIPINE BESYLATE 10 MG
5 TABLET ORAL DAILY
Refills: 0 | Status: DISCONTINUED | OUTPATIENT
Start: 2024-10-28 | End: 2024-10-29

## 2024-10-28 RX ORDER — VALSARTAN 160 MG/1
160 TABLET ORAL DAILY
Refills: 0 | Status: DISCONTINUED | OUTPATIENT
Start: 2024-10-29 | End: 2024-10-29

## 2024-10-28 RX ORDER — OXYCODONE HYDROCHLORIDE 30 MG/1
10 TABLET ORAL EVERY 4 HOURS
Refills: 0 | Status: DISCONTINUED | OUTPATIENT
Start: 2024-10-28 | End: 2024-10-29

## 2024-10-28 RX ORDER — OXYCODONE HYDROCHLORIDE 30 MG/1
5 TABLET ORAL EVERY 4 HOURS
Refills: 0 | Status: DISCONTINUED | OUTPATIENT
Start: 2024-10-28 | End: 2024-10-29

## 2024-10-28 RX ORDER — LIDOCAINE HCL 60 MG/3 ML
0.2 SYRINGE (ML) INJECTION ONCE
Refills: 0 | Status: DISCONTINUED | OUTPATIENT
Start: 2024-10-28 | End: 2024-10-28

## 2024-10-28 RX ORDER — POLYETHYLENE GLYCOL 3350 17 G/17G
17 POWDER, FOR SOLUTION ORAL AT BEDTIME
Refills: 0 | Status: DISCONTINUED | OUTPATIENT
Start: 2024-10-28 | End: 2024-10-29

## 2024-10-28 RX ORDER — SODIUM CHLORIDE 9 MG/ML
500 INJECTION, SOLUTION INTRAMUSCULAR; INTRAVENOUS; SUBCUTANEOUS ONCE
Refills: 0 | Status: COMPLETED | OUTPATIENT
Start: 2024-10-28 | End: 2024-10-28

## 2024-10-28 RX ORDER — ACETAMINOPHEN 500 MG
1000 TABLET ORAL ONCE
Refills: 0 | Status: COMPLETED | OUTPATIENT
Start: 2024-10-29 | End: 2024-10-29

## 2024-10-28 RX ORDER — CEFAZOLIN SODIUM 1 G
2000 VIAL (EA) INJECTION EVERY 8 HOURS
Refills: 0 | Status: COMPLETED | OUTPATIENT
Start: 2024-10-28 | End: 2024-10-29

## 2024-10-28 RX ORDER — SODIUM CHLORIDE 9 MG/ML
500 INJECTION, SOLUTION INTRAMUSCULAR; INTRAVENOUS; SUBCUTANEOUS ONCE
Refills: 0 | Status: DISCONTINUED | OUTPATIENT
Start: 2024-10-28 | End: 2024-10-29

## 2024-10-28 RX ORDER — CEFAZOLIN SODIUM 1 G
2000 VIAL (EA) INJECTION EVERY 8 HOURS
Refills: 0 | Status: DISCONTINUED | OUTPATIENT
Start: 2024-10-28 | End: 2024-10-28

## 2024-10-28 RX ORDER — CEFAZOLIN SODIUM 1 G
2000 VIAL (EA) INJECTION ONCE
Refills: 0 | Status: COMPLETED | OUTPATIENT
Start: 2024-10-28 | End: 2024-10-28

## 2024-10-28 RX ORDER — KETOROLAC TROMETHAMINE 30 MG/ML
15 INJECTION INTRAMUSCULAR; INTRAVENOUS EVERY 6 HOURS
Refills: 0 | Status: COMPLETED | OUTPATIENT
Start: 2024-10-28 | End: 2024-10-29

## 2024-10-28 RX ORDER — HYDROMORPHONE HCL/0.9% NACL/PF 6 MG/30 ML
0.5 PATIENT CONTROLLED ANALGESIA SYRINGE INTRAVENOUS
Refills: 0 | Status: DISCONTINUED | OUTPATIENT
Start: 2024-10-28 | End: 2024-10-28

## 2024-10-28 RX ORDER — ACETAMINOPHEN 500 MG
1000 TABLET ORAL ONCE
Refills: 0 | Status: COMPLETED | OUTPATIENT
Start: 2024-10-28 | End: 2024-10-28

## 2024-10-28 RX ORDER — PANTOPRAZOLE SODIUM 40 MG/1
40 TABLET, DELAYED RELEASE ORAL ONCE
Refills: 0 | Status: COMPLETED | OUTPATIENT
Start: 2024-10-28 | End: 2024-10-28

## 2024-10-28 RX ORDER — SODIUM CHLORIDE 9 MG/ML
500 INJECTION, SOLUTION INTRAMUSCULAR; INTRAVENOUS; SUBCUTANEOUS ONCE
Refills: 0 | Status: COMPLETED | OUTPATIENT
Start: 2024-10-28 | End: 2024-10-29

## 2024-10-28 RX ORDER — SODIUM CHLORIDE 9 MG/ML
3 INJECTION, SOLUTION INTRAMUSCULAR; INTRAVENOUS; SUBCUTANEOUS EVERY 8 HOURS
Refills: 0 | Status: DISCONTINUED | OUTPATIENT
Start: 2024-10-28 | End: 2024-10-28

## 2024-10-28 RX ORDER — MELATONIN 5 MG
3 TABLET ORAL AT BEDTIME
Refills: 0 | Status: DISCONTINUED | OUTPATIENT
Start: 2024-10-28 | End: 2024-10-29

## 2024-10-28 RX ORDER — MAGNESIUM HYDROXIDE 1200 MG/15ML
30 SUSPENSION ORAL DAILY
Refills: 0 | Status: DISCONTINUED | OUTPATIENT
Start: 2024-10-28 | End: 2024-10-29

## 2024-10-28 RX ORDER — ONDANSETRON HYDROCHLORIDE 2 MG/ML
4 INJECTION, SOLUTION INTRAMUSCULAR; INTRAVENOUS EVERY 6 HOURS
Refills: 0 | Status: DISCONTINUED | OUTPATIENT
Start: 2024-10-28 | End: 2024-10-29

## 2024-10-28 RX ORDER — VALSARTAN 160 MG/1
160 TABLET ORAL DAILY
Refills: 0 | Status: DISCONTINUED | OUTPATIENT
Start: 2024-10-28 | End: 2024-10-28

## 2024-10-28 RX ORDER — AMLODIPINE BESYLATE 10 MG
5 TABLET ORAL DAILY
Refills: 0 | Status: DISCONTINUED | OUTPATIENT
Start: 2024-10-28 | End: 2024-10-28

## 2024-10-28 RX ORDER — CHLORHEXIDINE GLUCONATE 40 MG/ML
1 SOLUTION TOPICAL ONCE
Refills: 0 | Status: DISCONTINUED | OUTPATIENT
Start: 2024-10-28 | End: 2024-10-28

## 2024-10-28 RX ORDER — TRAMADOL HYDROCHLORIDE 50 MG/1
50 TABLET, COATED ORAL ONCE
Refills: 0 | Status: DISCONTINUED | OUTPATIENT
Start: 2024-10-28 | End: 2024-10-28

## 2024-10-28 RX ORDER — ASPIRIN/MAG CARB/ALUMINUM AMIN 325 MG
81 TABLET ORAL
Refills: 0 | Status: DISCONTINUED | OUTPATIENT
Start: 2024-10-28 | End: 2024-10-29

## 2024-10-28 RX ORDER — B-COMPLEX WITH VITAMIN C
1 VIAL (ML) INJECTION DAILY
Refills: 0 | Status: DISCONTINUED | OUTPATIENT
Start: 2024-10-28 | End: 2024-10-29

## 2024-10-28 RX ORDER — CELECOXIB 100 MG
200 CAPSULE ORAL EVERY 12 HOURS
Refills: 0 | Status: DISCONTINUED | OUTPATIENT
Start: 2024-10-30 | End: 2024-10-29

## 2024-10-28 RX ORDER — ACETAMINOPHEN 500 MG
1000 TABLET ORAL ONCE
Refills: 0 | Status: DISCONTINUED | OUTPATIENT
Start: 2024-10-29 | End: 2024-10-29

## 2024-10-28 RX ORDER — TRAMADOL HYDROCHLORIDE 50 MG/1
50 TABLET, COATED ORAL EVERY 6 HOURS
Refills: 0 | Status: DISCONTINUED | OUTPATIENT
Start: 2024-10-28 | End: 2024-10-29

## 2024-10-28 RX ORDER — ACETAMINOPHEN 500 MG
1000 TABLET ORAL EVERY 8 HOURS
Refills: 0 | Status: DISCONTINUED | OUTPATIENT
Start: 2024-10-29 | End: 2024-10-29

## 2024-10-28 RX ORDER — SENNA 187 MG
2 TABLET ORAL AT BEDTIME
Refills: 0 | Status: DISCONTINUED | OUTPATIENT
Start: 2024-10-28 | End: 2024-10-29

## 2024-10-28 RX ADMIN — Medication 1000 MILLIGRAM(S): at 21:42

## 2024-10-28 RX ADMIN — Medication 2 TABLET(S): at 21:21

## 2024-10-28 RX ADMIN — Medication 75 MILLILITER(S): at 17:10

## 2024-10-28 RX ADMIN — Medication 400 MILLIGRAM(S): at 21:22

## 2024-10-28 RX ADMIN — PANTOPRAZOLE SODIUM 40 MILLIGRAM(S): 40 TABLET, DELAYED RELEASE ORAL at 10:46

## 2024-10-28 RX ADMIN — KETOROLAC TROMETHAMINE 15 MILLIGRAM(S): 30 INJECTION INTRAMUSCULAR; INTRAVENOUS at 19:57

## 2024-10-28 RX ADMIN — OXYCODONE HYDROCHLORIDE 5 MILLIGRAM(S): 30 TABLET ORAL at 18:25

## 2024-10-28 RX ADMIN — SODIUM CHLORIDE 500 MILLILITER(S): 9 INJECTION, SOLUTION INTRAMUSCULAR; INTRAVENOUS; SUBCUTANEOUS at 16:06

## 2024-10-28 RX ADMIN — KETOROLAC TROMETHAMINE 15 MILLIGRAM(S): 30 INJECTION INTRAMUSCULAR; INTRAVENOUS at 20:15

## 2024-10-28 RX ADMIN — Medication 100 MILLIGRAM(S): at 20:50

## 2024-10-28 RX ADMIN — POLYETHYLENE GLYCOL 3350 17 GRAM(S): 17 POWDER, FOR SOLUTION ORAL at 21:23

## 2024-10-28 RX ADMIN — OXYCODONE HYDROCHLORIDE 5 MILLIGRAM(S): 30 TABLET ORAL at 23:17

## 2024-10-28 RX ADMIN — TRAMADOL HYDROCHLORIDE 50 MILLIGRAM(S): 50 TABLET, COATED ORAL at 10:46

## 2024-10-28 RX ADMIN — Medication 81 MILLIGRAM(S): at 18:25

## 2024-10-28 NOTE — PHYSICAL THERAPY INITIAL EVALUATION ADULT - ADDITIONAL COMMENTS
Pt lives in a house with two steps to enter.  Pt performed ADL/IADLs independently. Ambulates without an assistive device. Owns DME: rolling walker

## 2024-10-28 NOTE — PHYSICAL THERAPY INITIAL EVALUATION ADULT - FUNCTIONAL LIMITATIONS, PT EVAL
PATIENT INSTRUCTIONS    Treatment:  No restrictions    Follow-Up:  Discharged - no further treatment anticipated.      Referral:       Time left: 3/5/2024 10:35 AM     Next appointment:        Location:        Provider:         Please note: 24 hour notice for cancellation of appointment is required.    You may receive a survey in the mail, or via the e-mail address that you have provided.  We would appreciate if you could fill out the survey and provide us with any feedback on your experience regarding your visit today. Thank you for allowing us to provide you with your health care needs.     Do not hesitate to call if you are experiencing severe pain, worsening or change in your pain, have symptoms of infection (fever, warmth, redness, increased drainage), or have any other problem that concerns you ~ [phone] (or [phone] after hours).    Please remember when requesting refills on pain medication that the request should be made by Thursday at the latest. [facility] is open Monday-Friday, 8am-5pm, and closed on the weekends.  No narcotic refills will be filled after hours.    Additional Educational Resources:  For additional resources regarding your symptoms, diagnosis, or further health information, please visit the Health Resources section on [email or web address] or the Online Health Resources section in [employer].       
self-care/home management/community/leisure

## 2024-10-28 NOTE — CHART NOTE - NSCHARTNOTEFT_GEN_A_CORE
Post-Operative Check    Pt evaluated in RR resting without complaints. Pt states pain is well tolerated. No Chest Pain, SOB, N/V.    Vitals:  T(C): 36.3 (10-28-24 @ 16:50), Max: 36.5 (10-28-24 @ 09:25)  HR: 79 (10-28-24 @ 16:50) (69 - 81)  BP: 120/67 (10-28-24 @ 16:50) (100/58 - 129/85)  RR: 36 (10-28-24 @ 16:50) (13 - 36)  SpO2: 99% (10-28-24 @ 16:50) (93% - 99%)    Exam:  Alert and Oriented, No Acute Distress. VSS.   Laterality: RLE     Aquacel dressing is clean, dry and intact.      (+) PF/DF/EHL/FHL 5/5     Sensation intact to light touch      2+ DP/PT pulse  Calves soft, non-tender bilaterally    Xray: < from: Xray Knee 1 or 2 Views, Right (10.28.24 @ 14:42) >  IMPRESSION:     Unconstrained right total knee prosthesis implanted.     Intact and aligned hardware and no periprosthetic fractures.     Postoperative soft tissue changes.     Correlate with intraoperative findings.    A/P: 69y Female s/p Right Total Knee Arthroplasty. VSS. NAD  -PT/OT: WBAT/OOB  -IS bedside  -Ice/elevation   -DVT PPx: Aspirin 81mg BID, SCD, Early OOB and Amb  -GI PPx: Protonix 40mg   -Pain Control  -Bowel regimen  -Continue post-op abx x 24hrs  -f/u AM labs.   -Dispo planning: PACU to Floor, pending PT/OT eval.     Gaston Pierre PA-C  Orthopedic Surgery Team  Team Pager #[phone]

## 2024-10-28 NOTE — PHYSICAL THERAPY INITIAL EVALUATION ADULT - PATIENT/FAMILY AGREES WITH PLAN
Medication is being filled for 1 time refill only due to:  Patient needs to be seen because overdue for 3 month f/u - 1 month refill only sent.   Lissett CALVILLO RN    Requested Prescriptions   Pending Prescriptions Disp Refills     metFORMIN (GLUCOPHAGE) 1000 MG tablet 180 tablet 0    Biguanide Agents Failed    12/6/2017  2:55 PM       Failed - Patient's BP is less than 140/90    BP Readings from Last 3 Encounters:   09/05/17 152/82   07/11/17 123/72   03/14/17 128/79                Failed - Patient has had a Microalbumin in the past 12 mos.    Recent Labs   Lab Test  03/03/16   1518   MICROL  8   UMALCR  4.29            Failed - Patient has documented A1c within the specified period of time.    Recent Labs   Lab Test  07/11/17   1023   A1C  9.4*            Passed - Patient has documented LDL within the past 12 mos.    Recent Labs   Lab Test  10/11/17   0810   LDL  46            Passed - Patient is age 10 or older       Passed - Patient's CR is NOT>1.4 OR Patient's EGFR is NOT<45 within past 12 mos.    Recent Labs   Lab Test  03/11/16   1344   GFRESTIMATED  64   GFRESTBLACK  78       Recent Labs   Lab Test  03/11/16   1344   CR  1.18            Passed - Patient does NOT have a diagnosis of CHF.       Passed - Recent (6 mos) or future visit with authorizing provider's specialty    Patient had office visit in the last 6 months or has a visit in the next 30 days with authorizing provider.  See chart review.                    yes

## 2024-10-28 NOTE — DISCHARGE NOTE PROVIDER - HOSPITAL COURSE
Reason for Admission	"Right Knee Replacement"  Goal: I want to walk without pain      This is a 69 year old female  admitted to [facility] on 10/28/24 for an elective total knee arthroplasty.  Surgery was uncomplicated.  Patient evaluated and treated by PT, recommended for home.  Remain of hospital stay unremarkable, and patient discharged home when PT cleared. History of Present Illness	  68 y/o F with PMHx of HTN, Osteoarthritis, Covid(05/2023), s/p left TKR 2023, presents with complaints of experiencing gradually worsening right knee pain for ~ 6-7 yrs. Patient is scheduled Right Total Knee Arthroplasty With Conrad with Dr. Cazares on 10/28/2024. Pt denies fever, chills, h/a, c/p or SOB.    Goal: I want to walk without pain    Allergies:           No Known Allergies:    PAST MEDICAL HISTORY:  COVID-19 5/2023  Cyst, ovarian   HTN (hypertension)   OA (osteoarthritis).     PAST SURGICAL HISTORY:  History of tonsillectomy   Ovarian cyst rupture   S/P total hysterectomy   S/P total knee replacement, left.       Patient presents to the hospital for elective surgery, underwent a right total knee replacement-  -tolerated procedure without complications.  Patient was evaluated by Physical therapy who recommended home with out patient PT for discharge plan.  Patient is stable for discharge when cleared by PT.

## 2024-10-28 NOTE — PHYSICAL THERAPY INITIAL EVALUATION ADULT - ACTIVE RANGE OF MOTION EXAMINATION, REHAB EVAL
Right LE limited in all planes/bilateral upper extremity Active ROM was WFL (within functional limits)/Left LE Active ROM was WFL (within functional limits)

## 2024-10-28 NOTE — DISCHARGE NOTE PROVIDER - NSDCCPTREATMENT_GEN_ALL_CORE_FT
PRINCIPAL PROCEDURE  Procedure: Arthroplasty, knee, total, robot-assisted, using Conrad system  Findings and Treatment: Rt

## 2024-10-28 NOTE — DISCHARGE NOTE PROVIDER - NSDCACTIVITY_GEN_ALL_CORE
Do not drive or operate machinery/Showering allowed/Stairs allowed/Walking - Indoors allowed/No heavy lifting/straining/Walking - Outdoors allowed Do not drive or operate machinery/Do not make important decisions/Stairs allowed/Walking - Indoors allowed/No heavy lifting/straining/Walking - Outdoors allowed/Follow Instructions Provided by your Surgical Team

## 2024-10-28 NOTE — DISCHARGE NOTE PROVIDER - NSDCFUADDINST_GEN_ALL_CORE_FT
Please follow up with Dr. Cazares at your scheduled follow up appointment in 2 weeks (Call office to confirm appointment).  PT-weight bearing as tolerated.  Aspirin 81mg twice daily x 4 weeks total for dvt prevention.  Keep dressing clean, dry and intact until date listed on dressing.  Have doctor remove any sutures (if applicable) at follow up visit.      Please follow up with your PMD within 1 month for routine checkup.  Please call Dr. Cazares' s office within next few days to schedule a follow up appointment about 14 days after surgery.   Recommend follow up with medical MD, within next 4 weeks.   Dressing should be removed per date on dressing.   Patient may shower, limit direct water to dressing, if wet pat dry.   Out of bed, ambulate, weight bearing as tolerated-   Physical therapy to assist with exercise and help increase endurance. Please contact Doctors office regarding arrangements for out patient Physical therapy.

## 2024-10-28 NOTE — PHYSICAL THERAPY INITIAL EVALUATION ADULT - PERTINENT HX OF CURRENT PROBLEM, REHAB EVAL
70 y/o F with PMHx of HTN, Osteoarthritis, Covid(05/2023), s/p left TKR 2023, presents with complaints of experiencing gradually worsening right knee pain for ~ 6-7 yrs. Patient is scheduled Right Total Knee Arthroplasty With Conrad with Dr. Cazares on 10/28/2024. Pt denies fever, chills, h/a, c/p or SOB.

## 2024-10-28 NOTE — PATIENT PROFILE ADULT - FALL HARM RISK - UNIVERSAL INTERVENTIONS
Bed in lowest position, wheels locked, appropriate side rails in place/Call bell, personal items and telephone in reach/Instruct patient to call for assistance before getting out of bed or chair/Non-slip footwear when patient is out of bed/[place] to call system/Physically safe environment - no spills, clutter or unnecessary equipment/Purposeful Proactive Rounding/Room/bathroom lighting operational, light cord in reach

## 2024-10-28 NOTE — DISCHARGE NOTE PROVIDER - NSDCFUSCHEDAPPT_GEN_ALL_CORE_FT
Hira Cazares  [facility]  ORTHOSURG [address]  Scheduled Appointment: 10/28/2024    [facility]  ORTHOSURG [address] [address]  Scheduled Appointment: 11/14/2024     [facility] Phys[facility]  ORTHOSURG [address] [facility]  Scheduled Appointment: 11/14/2024

## 2024-10-28 NOTE — PATIENT PROFILE ADULT - FUNCTIONAL ASSESSMENT - DAILY ACTIVITY 1.
Chronic-Stable: Reviewed and analyzed results of physiologic download from patient's machine and reviewed with patient. Supplies and parts as needed for his machine. These are medically necessary. Limit caffeine use after 3pm. Based on the analyzed data will continue with current settings. Stable on his machine at current settings, getting benefit from the use, and having minimal side effects. Will see him back in 1 year. Encouraged him to contact the office with any questions or concerns. 4 = No assist / stand by assistance

## 2024-10-28 NOTE — DISCHARGE NOTE PROVIDER - CARE PROVIDER_API CALL
Hira Cazares  Joint Reconstruction  [address], [address]  [place] [address]  Phone: [phone]  Fax: [phone]  Follow Up Time:

## 2024-10-28 NOTE — PATIENT PROFILE ADULT - FALL HARM RISK - ATTEMPT OOB
50 y/o homeless F c/o R inner thing wound x2 weeks. Pt appears well nontoxic, on exam there is a 1x1cm circular wound to R inner thing.. Not infected, well appearing, almost healed wound. Will give bacitracin. Pt is told to f/u for a recheck within 48-72hrs. 50 y/o homeless F c/o R inner thing wound x2 weeks. Pt appears well nontoxic, on exam there is a 1x1cm circular superficial  wound to R inner thing.. Not infected, well appearing, almost healed . Will give bacitracin. Pt is told to f/u for a recheck within 48-72hrs. No

## 2024-10-28 NOTE — DISCHARGE NOTE PROVIDER - NSDCMRMEDTOKEN_GEN_ALL_CORE_FT
amlodipine-valsartan 5 mg-160 mg oral tablet: 1 tab(s) orally once a day  Multiple Vitamins oral tablet: 1 tab(s) orally once a day   acetaminophen 500 mg oral tablet: 2 tab(s) orally every 8 hours Continue for 7 days, then as needed  amlodipine-valsartan 5 mg-160 mg oral tablet: 1 tab(s) orally once a day  aspirin 81 mg oral delayed release tablet: 1 tab(s) orally 2 times a day Continue for 30 days MDD: 2  Multiple Vitamins oral tablet: 1 tab(s) orally once a day  naproxen 500 mg oral tablet: 1 tab(s) orally 2 times a day Continue for 2 weeks, then stop MDD: 2  Narcan 4 mg/0.1 mL nasal spray: 1 spray(s) intranasally every 2 minutes Alternate nostrils  oxyCODONE 5 mg oral tablet: 1 tab(s) orally every 4 hours as needed for Moderate Pain (4 - 6) , Or 2 tabs orally every 4 hours as needed for severe pain (7-10) MDD: 6  pantoprazole 40 mg oral delayed release tablet: 1 tab(s) orally once a day (before a meal) MDD: 1  polyethylene glycol 3350 oral powder for reconstitution: 17 gram(s) orally once a day (at bedtime) as needed for  constipation  senna leaf extract oral tablet: 2 tab(s) orally once a day (at bedtime)  traMADol 50 mg oral tablet: 1 tab(s) orally every 6 hours as needed for Mild Pain (1 - 3) MDD: 4

## 2024-10-29 ENCOUNTER — TRANSCRIPTION ENCOUNTER (OUTPATIENT)
Age: 69
End: 2024-10-29

## 2024-10-29 VITALS
OXYGEN SATURATION: 99 % | SYSTOLIC BLOOD PRESSURE: 103 MMHG | DIASTOLIC BLOOD PRESSURE: 57 MMHG | TEMPERATURE: 98 F | HEART RATE: 67 BPM | RESPIRATION RATE: 18 BRPM

## 2024-10-29 LAB
ANION GAP SERPL CALC-SCNC: 14 MMOL/L — SIGNIFICANT CHANGE UP (ref 5–17)
BUN SERPL-MCNC: 17 MG/DL — SIGNIFICANT CHANGE UP (ref 7–23)
CALCIUM SERPL-MCNC: 8.8 MG/DL — SIGNIFICANT CHANGE UP (ref 8.4–10.5)
CHLORIDE SERPL-SCNC: 105 MMOL/L — SIGNIFICANT CHANGE UP (ref 96–108)
CO2 SERPL-SCNC: 22 MMOL/L — SIGNIFICANT CHANGE UP (ref 22–31)
CREAT SERPL-MCNC: 0.48 MG/DL — LOW (ref 0.5–1.3)
EGFR: 102 ML/MIN/1.73M2 — SIGNIFICANT CHANGE UP
GLUCOSE SERPL-MCNC: 130 MG/DL — HIGH (ref 70–99)
HCT VFR BLD CALC: 35 % — SIGNIFICANT CHANGE UP (ref 34.5–45)
HGB BLD-MCNC: 11.4 G/DL — LOW (ref 11.5–15.5)
MCHC RBC-ENTMCNC: 28.9 PG — SIGNIFICANT CHANGE UP (ref 27–34)
MCHC RBC-ENTMCNC: 32.6 GM/DL — SIGNIFICANT CHANGE UP (ref 32–36)
MCV RBC AUTO: 88.8 FL — SIGNIFICANT CHANGE UP (ref 80–100)
NRBC # BLD: 0 /100 WBCS — SIGNIFICANT CHANGE UP (ref 0–0)
PLATELET # BLD AUTO: 245 K/UL — SIGNIFICANT CHANGE UP (ref 150–400)
POTASSIUM SERPL-MCNC: 3.7 MMOL/L — SIGNIFICANT CHANGE UP (ref 3.5–5.3)
POTASSIUM SERPL-SCNC: 3.7 MMOL/L — SIGNIFICANT CHANGE UP (ref 3.5–5.3)
RBC # BLD: 3.94 M/UL — SIGNIFICANT CHANGE UP (ref 3.8–5.2)
RBC # FLD: 12.9 % — SIGNIFICANT CHANGE UP (ref 10.3–14.5)
SODIUM SERPL-SCNC: 141 MMOL/L — SIGNIFICANT CHANGE UP (ref 135–145)
WBC # BLD: 14.92 K/UL — HIGH (ref 3.8–10.5)
WBC # FLD AUTO: 14.92 K/UL — HIGH (ref 3.8–10.5)

## 2024-10-29 PROCEDURE — C1776: CPT

## 2024-10-29 PROCEDURE — C1713: CPT

## 2024-10-29 PROCEDURE — 73560 X-RAY EXAM OF KNEE 1 OR 2: CPT

## 2024-10-29 PROCEDURE — 97161 PT EVAL LOW COMPLEX 20 MIN: CPT

## 2024-10-29 PROCEDURE — 80048 BASIC METABOLIC PNL TOTAL CA: CPT

## 2024-10-29 PROCEDURE — 82962 GLUCOSE BLOOD TEST: CPT

## 2024-10-29 PROCEDURE — 97166 OT EVAL MOD COMPLEX 45 MIN: CPT

## 2024-10-29 PROCEDURE — 85027 COMPLETE CBC AUTOMATED: CPT

## 2024-10-29 PROCEDURE — S2900: CPT

## 2024-10-29 RX ORDER — ASPIRIN/MAG CARB/ALUMINUM AMIN 325 MG
1 TABLET ORAL
Qty: 60 | Refills: 0
Start: 2024-10-29 | End: 2024-11-27

## 2024-10-29 RX ORDER — NALOXONE HYDROCHLORIDE 1 MG/ML
1 INJECTION, SOLUTION INTRAMUSCULAR; INTRAVENOUS; SUBCUTANEOUS
Qty: 1 | Refills: 0
Start: 2024-10-29

## 2024-10-29 RX ORDER — SENNA 187 MG
2 TABLET ORAL
Qty: 0 | Refills: 0 | DISCHARGE
Start: 2024-10-29

## 2024-10-29 RX ORDER — ACETAMINOPHEN 500 MG
2 TABLET ORAL
Qty: 0 | Refills: 0 | DISCHARGE
Start: 2024-10-29

## 2024-10-29 RX ORDER — PANTOPRAZOLE SODIUM 40 MG/1
1 TABLET, DELAYED RELEASE ORAL
Qty: 30 | Refills: 0
Start: 2024-10-29 | End: 2024-11-27

## 2024-10-29 RX ORDER — NAPROXEN 250 MG/1
1 TABLET ORAL
Qty: 28 | Refills: 0
Start: 2024-10-29 | End: 2024-11-11

## 2024-10-29 RX ORDER — OXYCODONE HYDROCHLORIDE 30 MG/1
1 TABLET ORAL
Qty: 40 | Refills: 0
Start: 2024-10-29

## 2024-10-29 RX ORDER — POLYETHYLENE GLYCOL 3350 17 G/17G
17 POWDER, FOR SOLUTION ORAL
Qty: 0 | Refills: 0 | DISCHARGE
Start: 2024-10-29

## 2024-10-29 RX ORDER — TRAMADOL HYDROCHLORIDE 50 MG/1
1 TABLET, COATED ORAL
Qty: 28 | Refills: 0
Start: 2024-10-29 | End: 2024-11-04

## 2024-10-29 RX ADMIN — Medication 81 MILLIGRAM(S): at 05:25

## 2024-10-29 RX ADMIN — TRAMADOL HYDROCHLORIDE 50 MILLIGRAM(S): 50 TABLET, COATED ORAL at 00:32

## 2024-10-29 RX ADMIN — Medication 100 MILLIGRAM(S): at 04:41

## 2024-10-29 RX ADMIN — OXYCODONE HYDROCHLORIDE 5 MILLIGRAM(S): 30 TABLET ORAL at 09:51

## 2024-10-29 RX ADMIN — ONDANSETRON HYDROCHLORIDE 4 MILLIGRAM(S): 2 INJECTION, SOLUTION INTRAMUSCULAR; INTRAVENOUS at 05:28

## 2024-10-29 RX ADMIN — OXYCODONE HYDROCHLORIDE 5 MILLIGRAM(S): 30 TABLET ORAL at 04:40

## 2024-10-29 RX ADMIN — OXYCODONE HYDROCHLORIDE 5 MILLIGRAM(S): 30 TABLET ORAL at 10:51

## 2024-10-29 RX ADMIN — KETOROLAC TROMETHAMINE 15 MILLIGRAM(S): 30 INJECTION INTRAMUSCULAR; INTRAVENOUS at 09:23

## 2024-10-29 RX ADMIN — Medication 5 MILLIGRAM(S): at 08:23

## 2024-10-29 RX ADMIN — VALSARTAN 160 MILLIGRAM(S): 160 TABLET ORAL at 08:23

## 2024-10-29 RX ADMIN — SODIUM CHLORIDE 500 MILLILITER(S): 9 INJECTION, SOLUTION INTRAMUSCULAR; INTRAVENOUS; SUBCUTANEOUS at 05:23

## 2024-10-29 RX ADMIN — KETOROLAC TROMETHAMINE 15 MILLIGRAM(S): 30 INJECTION INTRAMUSCULAR; INTRAVENOUS at 01:36

## 2024-10-29 RX ADMIN — OXYCODONE HYDROCHLORIDE 5 MILLIGRAM(S): 30 TABLET ORAL at 05:40

## 2024-10-29 RX ADMIN — TRAMADOL HYDROCHLORIDE 50 MILLIGRAM(S): 50 TABLET, COATED ORAL at 01:32

## 2024-10-29 RX ADMIN — OXYCODONE HYDROCHLORIDE 5 MILLIGRAM(S): 30 TABLET ORAL at 00:17

## 2024-10-29 RX ADMIN — Medication 1000 MILLIGRAM(S): at 05:40

## 2024-10-29 RX ADMIN — Medication 400 MILLIGRAM(S): at 05:23

## 2024-10-29 RX ADMIN — KETOROLAC TROMETHAMINE 15 MILLIGRAM(S): 30 INJECTION INTRAMUSCULAR; INTRAVENOUS at 08:23

## 2024-10-29 RX ADMIN — PANTOPRAZOLE SODIUM 40 MILLIGRAM(S): 40 TABLET, DELAYED RELEASE ORAL at 05:24

## 2024-10-29 NOTE — PROGRESS NOTE ADULT - SUBJECTIVE AND OBJECTIVE BOX
SUBJECTIVE  Patient seen and examined at bedside. No acute events overnight. States pain is controlled. Denies fevers/chills, chest pain, or dyspnea.    OBJECTIVE  Vital Signs Last 24 Hrs  T(C): 36.7 (29 Oct 2024 04:11), Max: 36.7 (28 Oct 2024 19:20)  T(F): 98 (29 Oct 2024 04:11), Max: 98 (28 Oct 2024 19:20)  HR: 78 (29 Oct 2024 04:11) (69 - 90)  BP: 109/64 (29 Oct 2024 04:11) (100/58 - 129/85)  BP(mean): 87 (28 Oct 2024 16:50) (75 - 102)  RR: 18 (29 Oct 2024 04:11) (13 - 36)  SpO2: 93% (29 Oct 2024 04:11) (92% - 99%)    Parameters below as of 29 Oct 2024 04:11  Patient On (Oxygen Delivery Method): room air        PHYSICAL EXAM  Gen: Lying in bed, NAD  Resp: Even, nonlabored breathing   RLE:  Aquacel dressing c/d/i  Compartments soft, no calf TTP b/l  Motor: TA/EHL/GS/FHL intact  Sensory: DP/SP/Tib/Zachary/Saph SILT  +DP pulse, WWP        LABS

## 2024-10-29 NOTE — OCCUPATIONAL THERAPY INITIAL EVALUATION ADULT - ADDITIONAL COMMENTS
pt reports lives in private home with spouse, 2 steps to enter, walk-in shower. Prior to admission Ind with ADLs/ambulating, no AD/DME. Owns RW, shower chair.

## 2024-10-29 NOTE — DISCHARGE NOTE NURSING/CASE MANAGEMENT/SOCIAL WORK - PATIENT PORTAL LINK FT
You can access the FollowMyHealth Patient Portal offered by [facility] by registering at the following website: [email or web address]. By joining [facility]’s FollowMyHealth portal, you will also be able to view your health information using other applications (apps) compatible with our system.

## 2024-10-29 NOTE — PROGRESS NOTE ADULT - ASSESSMENT
69y Female s/p R TKA on 10/28/24    PLAN   -WBAT RLE  -FU AM labs  -pain control  -ice/cold compress, elevation  -incentive spirometry  -DVT ppx: A81 BID, SCDs, early ambulation   -PT/OT  -dispo planning: outpatient PT       Starr Sagastume MD, PGY-2  Orthopaedic Surgery  [facility] [id number]  [facility]        [id number]  [facility]  [id number]/ [phone]

## 2024-10-29 NOTE — OCCUPATIONAL THERAPY INITIAL EVALUATION ADULT - PERTINENT HX OF CURRENT PROBLEM, REHAB EVAL
83 y/o F PMH of HTN, hypothyroidism, RA, renal cell carcinoma, s/p L nephrectomy, surgical replacement of right hip performed by Dr. Gallardo in 2015, and B/L knee replacements, s/p fall at home on 9/10/24 where her leg gave out under her and she fell. She presented to the ED.  XRAY: There is a dislocation noted on the AP pelvis and right hip exam related to the femoral component of the total hip prosthesis.  This is her 3rd fall since June with dislocation of the hip x 3.  S/P reduction of right hip under propofol sedation; joint in a anatomical position on x-ray.  Plan for right revision total hip arthroplasty with HERBERTH robot, posterior approach on 10/28/24 with Dr. Cazares. now s/p R Total revision of hip replacement 10/28

## 2024-10-29 NOTE — DISCHARGE NOTE NURSING/CASE MANAGEMENT/SOCIAL WORK - FINANCIAL ASSISTANCE
[facility] provides services at a reduced cost to those who are determined to be eligible through [facility]’s financial assistance program. Information regarding [facility]’s financial assistance program can be found by going to [email or web address] or by calling [phone].

## 2024-10-30 ENCOUNTER — TRANSCRIPTION ENCOUNTER (OUTPATIENT)
Age: 69
End: 2024-10-30

## 2024-10-30 ENCOUNTER — NON-APPOINTMENT (OUTPATIENT)
Age: 69
End: 2024-10-30

## 2024-10-31 DIAGNOSIS — R11.0 NAUSEA: ICD-10-CM

## 2024-10-31 RX ORDER — ONDANSETRON 4 MG/1
4 TABLET, ORALLY DISINTEGRATING ORAL EVERY 8 HOURS
Qty: 9 | Refills: 0 | Status: ACTIVE | COMMUNITY
Start: 2024-10-31 | End: 1900-01-01

## 2024-11-01 ENCOUNTER — TRANSCRIPTION ENCOUNTER (OUTPATIENT)
Age: 69
End: 2024-11-01

## 2024-11-04 ENCOUNTER — NON-APPOINTMENT (OUTPATIENT)
Age: 69
End: 2024-11-04

## 2024-11-05 ENCOUNTER — NON-APPOINTMENT (OUTPATIENT)
Age: 69
End: 2024-11-05

## 2024-11-07 ENCOUNTER — TRANSCRIPTION ENCOUNTER (OUTPATIENT)
Age: 69
End: 2024-11-07

## 2024-11-08 RX ORDER — TRAMADOL HYDROCHLORIDE 50 MG/1
50 TABLET, COATED ORAL
Qty: 28 | Refills: 0 | Status: ACTIVE | COMMUNITY
Start: 2024-11-08 | End: 1900-01-01

## 2024-11-14 ENCOUNTER — APPOINTMENT (OUTPATIENT)
Dept: ORTHOPEDIC SURGERY | Facility: CLINIC | Age: 69
End: 2024-11-14
Payer: MEDICARE

## 2024-11-14 VITALS — WEIGHT: 122 LBS | HEIGHT: 59 IN | BODY MASS INDEX: 24.6 KG/M2

## 2024-11-14 PROCEDURE — 73564 X-RAY EXAM KNEE 4 OR MORE: CPT | Mod: 26,RT

## 2024-11-14 PROCEDURE — 99024 POSTOP FOLLOW-UP VISIT: CPT

## 2024-11-15 RX ORDER — OXYCODONE 5 MG/1
5 TABLET ORAL
Qty: 28 | Refills: 0 | Status: ACTIVE | COMMUNITY
Start: 2024-11-05 | End: 1900-01-01

## 2024-11-17 PROBLEM — Z96.651 STATUS POST RIGHT KNEE REPLACEMENT: Status: ACTIVE | Noted: 2024-11-12

## 2024-11-18 ENCOUNTER — NON-APPOINTMENT (OUTPATIENT)
Age: 69
End: 2024-11-18

## 2024-11-22 ENCOUNTER — TRANSCRIPTION ENCOUNTER (OUTPATIENT)
Age: 69
End: 2024-11-22

## 2024-11-25 RX ORDER — TRAMADOL HYDROCHLORIDE 50 MG/1
50 TABLET, COATED ORAL
Qty: 28 | Refills: 0 | Status: ACTIVE | COMMUNITY
Start: 2024-11-25 | End: 1900-01-01

## 2024-12-03 ENCOUNTER — NON-APPOINTMENT (OUTPATIENT)
Age: 69
End: 2024-12-03

## 2024-12-19 ENCOUNTER — APPOINTMENT (OUTPATIENT)
Dept: ORTHOPEDIC SURGERY | Facility: CLINIC | Age: 69
End: 2024-12-19
Payer: COMMERCIAL

## 2024-12-19 VITALS — HEIGHT: 59 IN | BODY MASS INDEX: 24.6 KG/M2 | WEIGHT: 122 LBS

## 2024-12-19 DIAGNOSIS — Z96.651 PRESENCE OF RIGHT ARTIFICIAL KNEE JOINT: ICD-10-CM

## 2024-12-19 PROCEDURE — 99024 POSTOP FOLLOW-UP VISIT: CPT

## 2024-12-19 RX ORDER — TRAMADOL HYDROCHLORIDE 50 MG/1
50 TABLET, COATED ORAL
Qty: 30 | Refills: 0 | Status: ACTIVE | COMMUNITY
Start: 2024-12-19 | End: 1900-01-01

## 2024-12-30 RX ORDER — OXYCODONE 5 MG/1
5 TABLET ORAL
Qty: 28 | Refills: 0 | Status: ACTIVE | COMMUNITY
Start: 2024-12-30 | End: 1900-01-01

## 2025-01-22 NOTE — PATIENT PROFILE ADULT - FUNCTIONAL ASSESSMENT - DAILY ACTIVITY 5.
Care Transitions Program Week 2 Follow-up Call    Current Issues/Problems reviewed on call: Pt reports she is not feeling well. Reports fevers since returning home, temp 100 today. Denies increased SOB-reports breathing is much better. Denies edema. Reports weight is stable (fluctuates by 3-4lbs). Pt reports fall 3 nights ago. She did receive call from outpt PT today-she will call back to schedule.     Pt missed TCM appt yesterday d/t time mixup. TCM was rescheduled for 1/27.     Pt confirms cardiology appt for tomorrow.     Advised pt to seek care should her temp become >101 or her SOB worsen.         Transitional Care Management (TCM) appointment was not completed.  TCM appointment plan of care and upcoming appointments were reviewed with patient.  Transportation to upcoming appointments to be provided by .    Next Care Transitions follow-up call will be within 1 week.    Plan for next follow-up call: general f/u.   
4 = No assist / stand by assistance

## 2025-02-07 ENCOUNTER — APPOINTMENT (OUTPATIENT)
Dept: ORTHOPEDIC SURGERY | Facility: CLINIC | Age: 70
End: 2025-02-07
Payer: COMMERCIAL

## 2025-02-07 VITALS — BODY MASS INDEX: 23.79 KG/M2 | WEIGHT: 118 LBS | HEIGHT: 59 IN

## 2025-02-07 DIAGNOSIS — M25.561 PAIN IN RIGHT KNEE: ICD-10-CM

## 2025-02-07 DIAGNOSIS — Z96.651 PRESENCE OF RIGHT ARTIFICIAL KNEE JOINT: ICD-10-CM

## 2025-02-07 PROCEDURE — 99214 OFFICE O/P EST MOD 30 MIN: CPT | Mod: 25

## 2025-02-07 PROCEDURE — 20610 DRAIN/INJ JOINT/BURSA W/O US: CPT | Mod: RT

## 2025-02-07 PROCEDURE — 73564 X-RAY EXAM KNEE 4 OR MORE: CPT | Mod: RT

## 2025-02-07 RX ORDER — MELOXICAM 15 MG/1
15 TABLET ORAL
Qty: 30 | Refills: 2 | Status: ACTIVE | COMMUNITY
Start: 2025-02-07 | End: 1900-01-01

## 2025-02-08 LAB
B PERT IGG+IGM PNL SER: ABNORMAL
COLOR FLD: NORMAL
CRP SERPL-MCNC: 3 MG/L
ERYTHROCYTE [SEDIMENTATION RATE] IN BLOOD BY WESTERGREN METHOD: 9 MM/HR
FLUID INTAKE SUBSTANCE CLASS: NORMAL
JOINT PCR PANEL: NOT DETECTED
JOINT PCR SOURCE: NORMAL
LYMPHOCYTES # FLD MANUAL: 35 %
MONOS+MACROS NFR FLD MANUAL: 42 %
NEUTS SEG # FLD MANUAL: 23 %
RBC # FLD MANUAL: NORMAL CELLS/UL
TOTAL CELLS COUNTED FLD: 239 CELLS/UL
TUBE TYPE: NORMAL
WBC COUNT: 235 CELLS/UL

## 2025-02-10 ENCOUNTER — NON-APPOINTMENT (OUTPATIENT)
Age: 70
End: 2025-02-10

## 2025-02-10 LAB
SYCRY CLARITY: ABNORMAL
SYCRY COLOR: ABNORMAL
SYCRY ID: NORMAL
SYCRY TUBE: NORMAL

## 2025-02-13 LAB — JOINT CULTURE: NORMAL

## 2025-02-16 LAB — FUNGUS FLD CULT: NORMAL

## 2025-03-19 ENCOUNTER — NON-APPOINTMENT (OUTPATIENT)
Age: 70
End: 2025-03-19

## 2025-03-28 ENCOUNTER — NON-APPOINTMENT (OUTPATIENT)
Age: 70
End: 2025-03-28

## 2025-04-04 ENCOUNTER — NON-APPOINTMENT (OUTPATIENT)
Age: 70
End: 2025-04-04

## 2025-04-11 ENCOUNTER — NON-APPOINTMENT (OUTPATIENT)
Age: 70
End: 2025-04-11

## 2025-04-28 ENCOUNTER — RX RENEWAL (OUTPATIENT)
Age: 70
End: 2025-04-28

## 2025-04-29 DIAGNOSIS — Z12.31 ENCOUNTER FOR SCREENING MAMMOGRAM FOR MALIGNANT NEOPLASM OF BREAST: ICD-10-CM

## 2025-05-06 ENCOUNTER — RX RENEWAL (OUTPATIENT)
Age: 70
End: 2025-05-06

## 2025-05-08 ENCOUNTER — APPOINTMENT (OUTPATIENT)
Dept: MAMMOGRAPHY | Facility: IMAGING CENTER | Age: 70
End: 2025-05-08
Payer: COMMERCIAL

## 2025-05-08 ENCOUNTER — RESULT REVIEW (OUTPATIENT)
Age: 70
End: 2025-05-08

## 2025-05-08 ENCOUNTER — OUTPATIENT (OUTPATIENT)
Dept: OUTPATIENT SERVICES | Facility: HOSPITAL | Age: 70
LOS: 1 days | End: 2025-05-08
Payer: COMMERCIAL

## 2025-05-08 DIAGNOSIS — N83.209 UNSPECIFIED OVARIAN CYST, UNSPECIFIED SIDE: Chronic | ICD-10-CM

## 2025-05-08 DIAGNOSIS — Z96.652 PRESENCE OF LEFT ARTIFICIAL KNEE JOINT: Chronic | ICD-10-CM

## 2025-05-08 DIAGNOSIS — Z00.8 ENCOUNTER FOR OTHER GENERAL EXAMINATION: ICD-10-CM

## 2025-05-08 DIAGNOSIS — Z90.89 ACQUIRED ABSENCE OF OTHER ORGANS: Chronic | ICD-10-CM

## 2025-05-08 DIAGNOSIS — Z90.710 ACQUIRED ABSENCE OF BOTH CERVIX AND UTERUS: Chronic | ICD-10-CM

## 2025-05-08 PROCEDURE — 77063 BREAST TOMOSYNTHESIS BI: CPT

## 2025-05-08 PROCEDURE — 77063 BREAST TOMOSYNTHESIS BI: CPT | Mod: 26

## 2025-05-08 PROCEDURE — 77067 SCR MAMMO BI INCL CAD: CPT | Mod: 26

## 2025-05-08 PROCEDURE — 77067 SCR MAMMO BI INCL CAD: CPT

## 2025-05-15 ENCOUNTER — NON-APPOINTMENT (OUTPATIENT)
Age: 70
End: 2025-05-15

## 2025-05-17 ENCOUNTER — NON-APPOINTMENT (OUTPATIENT)
Age: 70
End: 2025-05-17

## 2025-06-17 ENCOUNTER — APPOINTMENT (OUTPATIENT)
Dept: OBGYN | Facility: CLINIC | Age: 70
End: 2025-06-17

## 2025-07-02 ENCOUNTER — NON-APPOINTMENT (OUTPATIENT)
Age: 70
End: 2025-07-02

## 2025-07-02 ENCOUNTER — APPOINTMENT (OUTPATIENT)
Dept: OBGYN | Facility: CLINIC | Age: 70
End: 2025-07-02
Payer: COMMERCIAL

## 2025-07-02 VITALS
WEIGHT: 117 LBS | SYSTOLIC BLOOD PRESSURE: 125 MMHG | DIASTOLIC BLOOD PRESSURE: 85 MMHG | BODY MASS INDEX: 23.59 KG/M2 | HEIGHT: 59 IN

## 2025-07-02 PROBLEM — Z13.31 DEPRESSION SCREENING: Status: ACTIVE | Noted: 2025-07-02

## 2025-07-02 PROCEDURE — 82270 OCCULT BLOOD FECES: CPT

## 2025-07-02 PROCEDURE — 99397 PER PM REEVAL EST PAT 65+ YR: CPT

## 2025-07-02 PROCEDURE — G0444 DEPRESSION SCREEN ANNUAL: CPT | Mod: 59

## 2025-07-02 PROCEDURE — 99459 PELVIC EXAMINATION: CPT

## 2025-07-06 ENCOUNTER — NON-APPOINTMENT (OUTPATIENT)
Age: 70
End: 2025-07-06

## 2025-07-14 ENCOUNTER — APPOINTMENT (OUTPATIENT)
Dept: OBGYN | Facility: CLINIC | Age: 70
End: 2025-07-14
Payer: COMMERCIAL

## 2025-07-14 PROCEDURE — 77080 DXA BONE DENSITY AXIAL: CPT

## 2025-07-15 ENCOUNTER — RX RENEWAL (OUTPATIENT)
Age: 70
End: 2025-07-15

## 2025-08-27 ENCOUNTER — NON-APPOINTMENT (OUTPATIENT)
Age: 70
End: 2025-08-27

## (undated) DEVICE — MAKO CHECKPOINT KIT FEMORAL / TIBIAL

## (undated) DEVICE — HOOD FLYTE STRYKER HELMET SHIELD

## (undated) DEVICE — GLV 8.5 PROTEXIS (WHITE)

## (undated) DEVICE — TUBING ALARIS PUMP MODULE NON-DEHP

## (undated) DEVICE — SUT MONOCRYL 2-0 27" SH UNDYED

## (undated) DEVICE — WARMING BLANKET UPPER ADULT

## (undated) DEVICE — SUT QUILL MONODERM 0 1/2 CIRCLE TAPR 45CM 26MM

## (undated) DEVICE — TUBING TUR 2 PRONG

## (undated) DEVICE — GLV 8.5 PROTEXIS (BLUE)

## (undated) DEVICE — PACK IV START WITH CHG

## (undated) DEVICE — SAW BLADE STRYKER SAGITTAL DUAL CUT 64X35X.89MM

## (undated) DEVICE — TUBING IV EXTENSION MACRO W CLAVE 7"

## (undated) DEVICE — PACK MIS KNEE (1 PIECE)

## (undated) DEVICE — HOOD T7 PLUS PEELAWAY

## (undated) DEVICE — MAKO BLADE STANDARD

## (undated) DEVICE — GLV 7.5 PROTEXIS (WHITE)

## (undated) DEVICE — GOWN SURGEON VEST

## (undated) DEVICE — SOL INJ LR 500ML

## (undated) DEVICE — SYR IV FLUSH SALINE 10ML 30/TY

## (undated) DEVICE — POSITIONER FOAM HEADREST (PINK)

## (undated) DEVICE — BAG DECANTER IV STERILE

## (undated) DEVICE — SOL INJ NS 0.9% 500ML 1-PORT

## (undated) DEVICE — VENODYNE/SCD SLEEVE CALF LARGE

## (undated) DEVICE — ELCTR BOVIE PENCIL SMOKE EVACUATION

## (undated) DEVICE — TRAY EPIDURAL SINGLE DOSE

## (undated) DEVICE — STYLET  ENDOTRACH 7.5MM X 10MM

## (undated) DEVICE — GLV 8 PROTEXIS (WHITE)

## (undated) DEVICE — STRYKER MIXEVAC 3 BONE CEMENT MIXER

## (undated) DEVICE — MAKO VIZADISC KNEE TRACKING KIT

## (undated) DEVICE — SOL INJ NS 0.9% 1000ML

## (undated) DEVICE — MAKO DRAPE KIT

## (undated) DEVICE — SOL IRR POUR H2O 250ML

## (undated) DEVICE — VENODYNE/SCD SLEEVE CALF MEDIUM

## (undated) DEVICE — SOL IRR POUR NS 0.9% 500ML

## (undated) DEVICE — POSITIONER FOAM EGG CRATE ULNAR 2PCS (PINK)

## (undated) DEVICE — SUT VICRYL 1 27" CPX UNDYED

## (undated) DEVICE — SAW BLADE STRYKER SAGITTAL DUAL CUT TEETH 35X64X.64MM

## (undated) DEVICE — NDL SPINAL 22G X 3.5" QUINCKE

## (undated) DEVICE — HOOD FLYTE STRYKER SURGICOOL W PEELAWAY

## (undated) DEVICE — MAKO BLADE NARROW

## (undated) DEVICE — SUT STRATAFIX SYMMETRIC PDS PLUS 1 18" CTX VIOLET

## (undated) DEVICE — DRSG AQUACEL 3.5 X 12"

## (undated) DEVICE — TOURNIQUET CUFF 34" DUAL PORT W PLC

## (undated) DEVICE — SUT PDO 2 1/2 CIRCLE 40MM NDL 45CM

## (undated) DEVICE — CATH IV SAFE BC 22G X 1" (BLUE)

## (undated) DEVICE — POSITIONER CARDIAC BUMP

## (undated) DEVICE — DRSG DERMABOND 0.7ML

## (undated) DEVICE — SUT QUILL MONODERM 2-0 3/8 CIRCLE 45CM

## (undated) DEVICE — SOL BETADINE POUCH 0.75OZ STERILE

## (undated) DEVICE — STRYKER INTERPULSE HANDPIECE W IRR SUCTION TUBE

## (undated) DEVICE — HOOD T7 NON-PEELAWAY

## (undated) DEVICE — DRAPE SURGICAL #1010

## (undated) DEVICE — SUT QUILL PDO 1 45CM CTX 48MM